# Patient Record
Sex: MALE | Race: WHITE | Employment: OTHER | URBAN - METROPOLITAN AREA
[De-identification: names, ages, dates, MRNs, and addresses within clinical notes are randomized per-mention and may not be internally consistent; named-entity substitution may affect disease eponyms.]

---

## 2018-07-09 ENCOUNTER — HOSPITAL ENCOUNTER (OUTPATIENT)
Age: 78
Discharge: HOME | End: 2018-07-11
Payer: MEDICARE

## 2018-07-09 PROCEDURE — 88305 TISSUE EXAM BY PATHOLOGIST: CPT

## 2018-09-28 ENCOUNTER — HOSPITAL ENCOUNTER (OUTPATIENT)
Age: 78
Discharge: HOME | End: 2018-09-30
Payer: MEDICARE

## 2018-09-28 PROCEDURE — 87077 CULTURE AEROBIC IDENTIFY: CPT

## 2018-09-28 PROCEDURE — 87088 URINE BACTERIA CULTURE: CPT

## 2018-09-28 PROCEDURE — 87186 SC STD MICRODIL/AGAR DIL: CPT

## 2018-09-30 LAB
ORGANISM: ABNORMAL
URINE CULTURE, ROUTINE: ABNORMAL
URINE CULTURE, ROUTINE: ABNORMAL

## 2018-10-08 ENCOUNTER — HOSPITAL ENCOUNTER (OUTPATIENT)
Age: 78
Discharge: HOME | End: 2018-10-10
Payer: MEDICARE

## 2018-10-08 PROCEDURE — 87088 URINE BACTERIA CULTURE: CPT

## 2018-10-08 PROCEDURE — 87077 CULTURE AEROBIC IDENTIFY: CPT

## 2018-10-08 PROCEDURE — 87186 SC STD MICRODIL/AGAR DIL: CPT

## 2018-10-10 LAB
ORGANISM: ABNORMAL
URINE CULTURE, ROUTINE: ABNORMAL
URINE CULTURE, ROUTINE: ABNORMAL

## 2018-10-11 VITALS
TEMPERATURE: 97.9 F | OXYGEN SATURATION: 99 % | WEIGHT: 145.5 LBS | HEIGHT: 71 IN | HEART RATE: 69 BPM | BODY MASS INDEX: 20.37 KG/M2 | RESPIRATION RATE: 18 BRPM | DIASTOLIC BLOOD PRESSURE: 78 MMHG | SYSTOLIC BLOOD PRESSURE: 145 MMHG

## 2018-10-12 ENCOUNTER — HOSPITAL ENCOUNTER (EMERGENCY)
Age: 78
Discharge: HOME | End: 2018-10-12
Payer: MEDICARE

## 2018-10-12 ENCOUNTER — PREP FOR PROCEDURE (OUTPATIENT)
Dept: UROLOGY | Age: 78
End: 2018-10-12

## 2018-10-12 ENCOUNTER — ANESTHESIA (OUTPATIENT)
Dept: OPERATING ROOM | Age: 78
End: 2018-10-12
Payer: MEDICARE

## 2018-10-12 ENCOUNTER — HOSPITAL ENCOUNTER (OUTPATIENT)
Age: 78
Setting detail: OUTPATIENT SURGERY
Discharge: HOME | End: 2018-10-12
Attending: UROLOGY | Admitting: UROLOGY
Payer: MEDICARE

## 2018-10-12 ENCOUNTER — ANESTHESIA EVENT (OUTPATIENT)
Dept: OPERATING ROOM | Age: 78
End: 2018-10-12
Payer: MEDICARE

## 2018-10-12 ENCOUNTER — APPOINTMENT (OUTPATIENT)
Dept: GENERAL RADIOLOGY | Age: 78
End: 2018-10-12
Attending: UROLOGY
Payer: MEDICARE

## 2018-10-12 VITALS
HEIGHT: 71 IN | RESPIRATION RATE: 18 BRPM | BODY MASS INDEX: 20.3 KG/M2 | DIASTOLIC BLOOD PRESSURE: 62 MMHG | TEMPERATURE: 97.9 F | WEIGHT: 145 LBS | HEART RATE: 52 BPM | OXYGEN SATURATION: 97 % | SYSTOLIC BLOOD PRESSURE: 108 MMHG

## 2018-10-12 VITALS — DIASTOLIC BLOOD PRESSURE: 63 MMHG | SYSTOLIC BLOOD PRESSURE: 111 MMHG | OXYGEN SATURATION: 100 %

## 2018-10-12 DIAGNOSIS — R33.9 RETENTION OF URINE: Primary | ICD-10-CM

## 2018-10-12 DIAGNOSIS — R33.9 URINARY RETENTION: Primary | ICD-10-CM

## 2018-10-12 PROCEDURE — 3700000001 HC ADD 15 MINUTES (ANESTHESIA): Performed by: UROLOGY

## 2018-10-12 PROCEDURE — 99283 EMERGENCY DEPT VISIT LOW MDM: CPT

## 2018-10-12 PROCEDURE — 6360000002 HC RX W HCPCS: Performed by: UROLOGY

## 2018-10-12 PROCEDURE — 7100000011 HC PHASE II RECOVERY - ADDTL 15 MIN: Performed by: UROLOGY

## 2018-10-12 PROCEDURE — 2580000003 HC RX 258

## 2018-10-12 PROCEDURE — 3600000013 HC SURGERY LEVEL 3 ADDTL 15MIN: Performed by: UROLOGY

## 2018-10-12 PROCEDURE — 2580000003 HC RX 258: Performed by: UROLOGY

## 2018-10-12 PROCEDURE — 87088 URINE BACTERIA CULTURE: CPT

## 2018-10-12 PROCEDURE — 3600000003 HC SURGERY LEVEL 3 BASE: Performed by: UROLOGY

## 2018-10-12 PROCEDURE — 74420 UROGRAPHY RTRGR +-KUB: CPT

## 2018-10-12 PROCEDURE — 3700000000 HC ANESTHESIA ATTENDED CARE: Performed by: UROLOGY

## 2018-10-12 PROCEDURE — C1758 CATHETER, URETERAL: HCPCS | Performed by: UROLOGY

## 2018-10-12 PROCEDURE — 6360000002 HC RX W HCPCS

## 2018-10-12 PROCEDURE — 7100000010 HC PHASE II RECOVERY - FIRST 15 MIN: Performed by: UROLOGY

## 2018-10-12 PROCEDURE — 2709999900 HC NON-CHARGEABLE SUPPLY: Performed by: UROLOGY

## 2018-10-12 RX ORDER — OXYCODONE HYDROCHLORIDE AND ACETAMINOPHEN 5; 325 MG/1; MG/1
1 TABLET ORAL EVERY 6 HOURS PRN
Status: DISCONTINUED | OUTPATIENT
Start: 2018-10-12 | End: 2018-10-12 | Stop reason: HOSPADM

## 2018-10-12 RX ORDER — FENTANYL CITRATE 50 UG/ML
INJECTION, SOLUTION INTRAMUSCULAR; INTRAVENOUS PRN
Status: DISCONTINUED | OUTPATIENT
Start: 2018-10-12 | End: 2018-10-12 | Stop reason: SDUPTHER

## 2018-10-12 RX ORDER — SODIUM CHLORIDE 9 MG/ML
INJECTION, SOLUTION INTRAVENOUS CONTINUOUS
Status: CANCELLED | OUTPATIENT
Start: 2018-10-12 | End: 2019-10-12

## 2018-10-12 RX ORDER — MORPHINE SULFATE 2 MG/ML
2 INJECTION, SOLUTION INTRAMUSCULAR; INTRAVENOUS EVERY 4 HOURS PRN
Status: DISCONTINUED | OUTPATIENT
Start: 2018-10-12 | End: 2018-10-12 | Stop reason: HOSPADM

## 2018-10-12 RX ORDER — SODIUM CHLORIDE 9 MG/ML
INJECTION, SOLUTION INTRAVENOUS CONTINUOUS PRN
Status: DISCONTINUED | OUTPATIENT
Start: 2018-10-12 | End: 2018-10-12 | Stop reason: SDUPTHER

## 2018-10-12 RX ORDER — SODIUM CHLORIDE 0.9 % (FLUSH) 0.9 %
10 SYRINGE (ML) INJECTION PRN
Status: CANCELLED | OUTPATIENT
Start: 2018-10-12 | End: 2019-10-12

## 2018-10-12 RX ORDER — SODIUM CHLORIDE 0.9 % (FLUSH) 0.9 %
10 SYRINGE (ML) INJECTION PRN
Status: DISCONTINUED | OUTPATIENT
Start: 2018-10-12 | End: 2018-10-12 | Stop reason: HOSPADM

## 2018-10-12 RX ORDER — SODIUM CHLORIDE 9 MG/ML
INJECTION, SOLUTION INTRAVENOUS CONTINUOUS
Status: DISCONTINUED | OUTPATIENT
Start: 2018-10-12 | End: 2018-10-12 | Stop reason: HOSPADM

## 2018-10-12 RX ORDER — CEPHALEXIN 500 MG/1
500 CAPSULE ORAL 3 TIMES DAILY
Qty: 21 CAPSULE | Refills: 0 | Status: SHIPPED | OUTPATIENT
Start: 2018-10-12 | End: 2018-10-19

## 2018-10-12 RX ORDER — SODIUM CHLORIDE 0.9 % (FLUSH) 0.9 %
10 SYRINGE (ML) INJECTION EVERY 12 HOURS SCHEDULED
Status: DISCONTINUED | OUTPATIENT
Start: 2018-10-12 | End: 2018-10-12 | Stop reason: HOSPADM

## 2018-10-12 RX ORDER — TRAMADOL HYDROCHLORIDE 50 MG/1
50 TABLET ORAL EVERY 6 HOURS PRN
Qty: 10 TABLET | Refills: 1 | Status: SHIPPED | OUTPATIENT
Start: 2018-10-12 | End: 2018-10-22

## 2018-10-12 RX ORDER — CIPROFLOXACIN 250 MG/1
250 TABLET, FILM COATED ORAL 2 TIMES DAILY
COMMUNITY
End: 2019-04-02 | Stop reason: ALTCHOICE

## 2018-10-12 RX ORDER — SODIUM CHLORIDE 0.9 % (FLUSH) 0.9 %
10 SYRINGE (ML) INJECTION EVERY 12 HOURS SCHEDULED
Status: CANCELLED | OUTPATIENT
Start: 2018-10-12 | End: 2019-10-12

## 2018-10-12 RX ORDER — ONDANSETRON 2 MG/ML
4 INJECTION INTRAMUSCULAR; INTRAVENOUS EVERY 6 HOURS PRN
Status: DISCONTINUED | OUTPATIENT
Start: 2018-10-12 | End: 2018-10-12 | Stop reason: HOSPADM

## 2018-10-12 RX ORDER — PROPOFOL 10 MG/ML
INJECTION, EMULSION INTRAVENOUS CONTINUOUS PRN
Status: DISCONTINUED | OUTPATIENT
Start: 2018-10-12 | End: 2018-10-12 | Stop reason: SDUPTHER

## 2018-10-12 RX ADMIN — Medication 2 G: at 12:56

## 2018-10-12 RX ADMIN — FENTANYL CITRATE 50 MCG: 50 INJECTION, SOLUTION INTRAMUSCULAR; INTRAVENOUS at 12:54

## 2018-10-12 RX ADMIN — SODIUM CHLORIDE: 9 INJECTION, SOLUTION INTRAVENOUS at 12:55

## 2018-10-12 RX ADMIN — SODIUM CHLORIDE: 9 INJECTION, SOLUTION INTRAVENOUS at 11:53

## 2018-10-12 RX ADMIN — PROPOFOL 80 MCG/KG/MIN: 10 INJECTION, EMULSION INTRAVENOUS at 12:56

## 2018-10-12 RX ADMIN — FENTANYL CITRATE 50 MCG: 50 INJECTION, SOLUTION INTRAMUSCULAR; INTRAVENOUS at 13:09

## 2018-10-12 ASSESSMENT — PULMONARY FUNCTION TESTS
PIF_VALUE: 1
PIF_VALUE: 0
PIF_VALUE: 0
PIF_VALUE: 1
PIF_VALUE: 0
PIF_VALUE: 1
PIF_VALUE: 1

## 2018-10-12 ASSESSMENT — PAIN SCALES - GENERAL
PAINLEVEL_OUTOF10: 0
PAINLEVEL_OUTOF10: 0

## 2018-10-12 ASSESSMENT — PAIN - FUNCTIONAL ASSESSMENT: PAIN_FUNCTIONAL_ASSESSMENT: 0-10

## 2018-10-12 NOTE — ED NOTES
Both I and ALISA Renteria attempted to put a Redd catheter in the pt and were unsuccessful     Jen Evangelista RN  10/12/18 [id number]

## 2018-10-12 NOTE — PROGRESS NOTES
Per CRNA's request, Dr. Ian Claudio (pt's PCP) updated through office on patient having ectopy in OR. Patient had a burst of PAT & some PVC's. None of which were sustained or required treatment. Patient & his wife updated also.

## 2018-10-14 LAB — URINE CULTURE, ROUTINE: NORMAL

## 2018-11-13 ENCOUNTER — HOSPITAL ENCOUNTER (OUTPATIENT)
Age: 78
Discharge: HOME | End: 2018-11-15
Payer: MEDICARE

## 2018-11-13 PROCEDURE — 87088 URINE BACTERIA CULTURE: CPT

## 2018-11-15 LAB — URINE CULTURE, ROUTINE: NORMAL

## 2018-12-05 ENCOUNTER — HOSPITAL ENCOUNTER (INPATIENT)
Age: 78
LOS: 2 days | Discharge: HOME | DRG: 310 | End: 2018-12-07
Attending: INTERNAL MEDICINE | Admitting: INTERNAL MEDICINE
Payer: MEDICARE

## 2018-12-05 PROBLEM — I47.29 NONSUSTAINED VENTRICULAR TACHYCARDIA (HCC): Status: ACTIVE | Noted: 2018-12-05

## 2018-12-05 PROBLEM — I47.20 VENTRICULAR TACHYCARDIA (PAROXYSMAL) (HCC): Status: ACTIVE | Noted: 2018-12-05

## 2018-12-05 PROBLEM — I34.0 MITRAL REGURGITATION: Status: ACTIVE | Noted: 2018-12-05

## 2018-12-05 PROBLEM — I47.29 VENTRICULAR TACHYCARDIA (PAROXYSMAL) (HCC): Status: ACTIVE | Noted: 2018-12-05

## 2018-12-05 PROBLEM — R55 NEAR SYNCOPE: Status: ACTIVE | Noted: 2018-12-05

## 2018-12-05 PROBLEM — R33.9 URINARY RETENTION: Status: RESOLVED | Noted: 2018-10-12 | Resolved: 2018-12-05

## 2018-12-05 PROCEDURE — 93005 ELECTROCARDIOGRAM TRACING: CPT

## 2018-12-05 PROCEDURE — 6370000000 HC RX 637 (ALT 250 FOR IP): Performed by: INTERNAL MEDICINE

## 2018-12-05 PROCEDURE — 2140000000 HC CCU INTERMEDIATE R&B

## 2018-12-05 PROCEDURE — 2580000003 HC RX 258: Performed by: INTERNAL MEDICINE

## 2018-12-05 RX ORDER — LISINOPRIL 5 MG/1
5 TABLET ORAL NIGHTLY
Status: DISCONTINUED | OUTPATIENT
Start: 2018-12-05 | End: 2018-12-07

## 2018-12-05 RX ORDER — CIPROFLOXACIN 250 MG/1
250 TABLET, FILM COATED ORAL 2 TIMES DAILY
Status: DISCONTINUED | OUTPATIENT
Start: 2018-12-05 | End: 2018-12-07 | Stop reason: HOSPADM

## 2018-12-05 RX ORDER — METOPROLOL SUCCINATE 25 MG/1
25 TABLET, EXTENDED RELEASE ORAL DAILY
Status: DISCONTINUED | OUTPATIENT
Start: 2018-12-05 | End: 2018-12-07 | Stop reason: HOSPADM

## 2018-12-05 RX ORDER — ONDANSETRON 2 MG/ML
4 INJECTION INTRAMUSCULAR; INTRAVENOUS EVERY 6 HOURS PRN
Status: DISCONTINUED | OUTPATIENT
Start: 2018-12-05 | End: 2018-12-07 | Stop reason: HOSPADM

## 2018-12-05 RX ORDER — SODIUM CHLORIDE 0.9 % (FLUSH) 0.9 %
10 SYRINGE (ML) INJECTION EVERY 12 HOURS SCHEDULED
Status: DISCONTINUED | OUTPATIENT
Start: 2018-12-05 | End: 2018-12-07 | Stop reason: HOSPADM

## 2018-12-05 RX ORDER — SODIUM CHLORIDE 0.9 % (FLUSH) 0.9 %
10 SYRINGE (ML) INJECTION PRN
Status: DISCONTINUED | OUTPATIENT
Start: 2018-12-05 | End: 2018-12-07 | Stop reason: HOSPADM

## 2018-12-05 RX ADMIN — METOPROLOL SUCCINATE 25 MG: 25 TABLET, FILM COATED, EXTENDED RELEASE ORAL at 19:44

## 2018-12-05 RX ADMIN — Medication 10 ML: at 21:53

## 2018-12-05 RX ADMIN — CIPROFLOXACIN HYDROCHLORIDE 250 MG: 250 TABLET, FILM COATED ORAL at 21:52

## 2018-12-05 RX ADMIN — LISINOPRIL 5 MG: 5 TABLET ORAL at 21:52

## 2018-12-05 ASSESSMENT — PAIN SCALES - GENERAL
PAINLEVEL_OUTOF10: 0
PAINLEVEL_OUTOF10: 0

## 2018-12-06 LAB
ALBUMIN SERPL-MCNC: 3.9 G/DL (ref 3.5–5.2)
ALP BLD-CCNC: 35 U/L (ref 40–129)
ALT SERPL-CCNC: 19 U/L (ref 0–40)
ANION GAP SERPL CALCULATED.3IONS-SCNC: 14 MMOL/L (ref 7–16)
AST SERPL-CCNC: 21 U/L (ref 0–39)
BILIRUB SERPL-MCNC: 0.7 MG/DL (ref 0–1.2)
BUN BLDV-MCNC: 23 MG/DL (ref 8–23)
CALCIUM SERPL-MCNC: 8.9 MG/DL (ref 8.6–10.2)
CHLORIDE BLD-SCNC: 105 MMOL/L (ref 98–107)
CO2: 21 MMOL/L (ref 22–29)
CREAT SERPL-MCNC: 1.1 MG/DL (ref 0.7–1.2)
GFR AFRICAN AMERICAN: >60
GFR NON-AFRICAN AMERICAN: >60 ML/MIN/1.73
GLUCOSE BLD-MCNC: 80 MG/DL (ref 74–99)
HCT VFR BLD CALC: 36.3 % (ref 37–54)
HEMOGLOBIN: 11.6 G/DL (ref 12.5–16.5)
LEFT VENTRICULAR EJECTION FRACTION HIGH VALUE: 50 %
LEFT VENTRICULAR EJECTION FRACTION MODE: NORMAL
LV EF: 45 %
LV EF: 48 %
LVEF MODALITY: NORMAL
MAGNESIUM: 2.2 MG/DL (ref 1.6–2.6)
MCH RBC QN AUTO: 29.9 PG (ref 26–35)
MCHC RBC AUTO-ENTMCNC: 32 % (ref 32–34.5)
MCV RBC AUTO: 93.6 FL (ref 80–99.9)
PDW BLD-RTO: 14.6 FL (ref 11.5–15)
PLATELET # BLD: 201 E9/L (ref 130–450)
PMV BLD AUTO: 11.1 FL (ref 7–12)
POTASSIUM SERPL-SCNC: 4 MMOL/L (ref 3.5–5)
PRO-BNP: 496 PG/ML (ref 0–450)
RBC # BLD: 3.88 E12/L (ref 3.8–5.8)
SODIUM BLD-SCNC: 140 MMOL/L (ref 132–146)
TOTAL PROTEIN: 6.1 G/DL (ref 6.4–8.3)
TSH SERPL DL<=0.05 MIU/L-ACNC: 1.94 UIU/ML (ref 0.27–4.2)
WBC # BLD: 5.9 E9/L (ref 4.5–11.5)

## 2018-12-06 PROCEDURE — 6370000000 HC RX 637 (ALT 250 FOR IP): Performed by: INTERNAL MEDICINE

## 2018-12-06 PROCEDURE — 83880 ASSAY OF NATRIURETIC PEPTIDE: CPT

## 2018-12-06 PROCEDURE — 80053 COMPREHEN METABOLIC PANEL: CPT

## 2018-12-06 PROCEDURE — 85027 COMPLETE CBC AUTOMATED: CPT

## 2018-12-06 PROCEDURE — 84443 ASSAY THYROID STIM HORMONE: CPT

## 2018-12-06 PROCEDURE — 36415 COLL VENOUS BLD VENIPUNCTURE: CPT

## 2018-12-06 PROCEDURE — 2580000003 HC RX 258: Performed by: INTERNAL MEDICINE

## 2018-12-06 PROCEDURE — 93306 TTE W/DOPPLER COMPLETE: CPT

## 2018-12-06 PROCEDURE — 83735 ASSAY OF MAGNESIUM: CPT

## 2018-12-06 PROCEDURE — 2140000000 HC CCU INTERMEDIATE R&B

## 2018-12-06 RX ADMIN — CIPROFLOXACIN HYDROCHLORIDE 250 MG: 250 TABLET, FILM COATED ORAL at 20:31

## 2018-12-06 RX ADMIN — METOPROLOL SUCCINATE 25 MG: 25 TABLET, FILM COATED, EXTENDED RELEASE ORAL at 08:18

## 2018-12-06 RX ADMIN — LISINOPRIL 5 MG: 5 TABLET ORAL at 20:35

## 2018-12-06 RX ADMIN — Medication 10 ML: at 08:19

## 2018-12-06 RX ADMIN — CIPROFLOXACIN HYDROCHLORIDE 250 MG: 250 TABLET, FILM COATED ORAL at 08:19

## 2018-12-06 RX ADMIN — Medication 10 ML: at 20:36

## 2018-12-06 ASSESSMENT — PAIN SCALES - GENERAL
PAINLEVEL_OUTOF10: 0

## 2018-12-06 NOTE — H&P
EXTREMITIES:  Otherwise unremarkable. RESPIRATORY SYSTEM:  Exam reveals a normal chest on inspection. Breath  sounds are equal  No rales or rhonchi. CARDIOVASCULAR SYSTEM:  Examination reveals a laterally displaced PMI  with a normal S1, S2 in the left sternal border and the apex. Grade 3/6  pansystolic murmur at the apex and the anterior axillary line was noted. ABDOMEN:  Soft. His EKG showed sinus rhythm with left bundle-branch block and frequent  PVCs. The Holter monitor performed by his family physician in October was  reviewed. There appeared to be frequent PVCs; however, one episode of  rapid nonsustained VT was also noted in addition to multiple episodes of  nonsustained atrial tachycardia. No other recent cardiac workup is available for review. ASSESSMENT:  A 70year-old patient with:  1. Known history of mitral valve disease with mitral valve repair  surgery more than 20 years ago. 2.  Recent episode of near syncope as well as one episode of documented  nonsustained VT on Holter monitoring a month ago. 3.  Frequent ventricular ectopy noted during his physical examinations  in the recent past.  4.  Left bundle-branch block on EKG, chronicity unclear. 5.  Progressive decline in exercise tolerance as per the patient's wife  who was in the office with him today. 6.  Physical exam suggests the presence of recurrent mitral  regurgitation. RECOMMENDATIONS:  In view of all of the above, I would suggest urgent  cardiac workup including echocardiography, lab work, and further  interventions as might be needed based on results of echocardiography. I suggested hospitalization and medical therapy to be started with beta  blockade as well as ACE inhibitor based on the results of his  echocardiography and the possible recurrence of mitral regurgitation. In addition, the episode of nonsustained VT and his episode of near  syncope are also of concern.   All of this was discussed with the  patient. He is agreeable to being hospitalized and therefore will be  admitted directly by me and started on medical therapy as well as  echocardiogram to be obtained as soon as possible. Further recommendations to follow.         Lina Evans MD    D: 12/05/2018 19:48:52       T: 12/05/2018 23:35:25     [email or web address]  Job#: [id number]     Doc#: [id number]    CC:  Renata Quintanilla MD

## 2018-12-07 VITALS
TEMPERATURE: 98 F | RESPIRATION RATE: 18 BRPM | BODY MASS INDEX: 20.16 KG/M2 | WEIGHT: 140.8 LBS | OXYGEN SATURATION: 99 % | HEART RATE: 51 BPM | SYSTOLIC BLOOD PRESSURE: 119 MMHG | DIASTOLIC BLOOD PRESSURE: 68 MMHG | HEIGHT: 70 IN

## 2018-12-07 LAB
ANION GAP SERPL CALCULATED.3IONS-SCNC: 12 MMOL/L (ref 7–16)
BUN BLDV-MCNC: 25 MG/DL (ref 8–23)
CALCIUM SERPL-MCNC: 9.1 MG/DL (ref 8.6–10.2)
CHLORIDE BLD-SCNC: 104 MMOL/L (ref 98–107)
CO2: 23 MMOL/L (ref 22–29)
CREAT SERPL-MCNC: 1.2 MG/DL (ref 0.7–1.2)
GFR AFRICAN AMERICAN: >60
GFR NON-AFRICAN AMERICAN: 59 ML/MIN/1.73
GLUCOSE BLD-MCNC: 91 MG/DL (ref 74–99)
MAGNESIUM: 2.2 MG/DL (ref 1.6–2.6)
POTASSIUM SERPL-SCNC: 4.3 MMOL/L (ref 3.5–5)
SODIUM BLD-SCNC: 139 MMOL/L (ref 132–146)

## 2018-12-07 PROCEDURE — 2580000003 HC RX 258: Performed by: INTERNAL MEDICINE

## 2018-12-07 PROCEDURE — 6370000000 HC RX 637 (ALT 250 FOR IP): Performed by: INTERNAL MEDICINE

## 2018-12-07 PROCEDURE — 80048 BASIC METABOLIC PNL TOTAL CA: CPT

## 2018-12-07 PROCEDURE — 36415 COLL VENOUS BLD VENIPUNCTURE: CPT

## 2018-12-07 PROCEDURE — 83735 ASSAY OF MAGNESIUM: CPT

## 2018-12-07 RX ORDER — METOPROLOL SUCCINATE 25 MG/1
25 TABLET, EXTENDED RELEASE ORAL DAILY
Qty: 30 TABLET | Refills: 3 | Status: ON HOLD | OUTPATIENT
Start: 2018-12-08 | End: 2019-04-03 | Stop reason: HOSPADM

## 2018-12-07 RX ORDER — CIPROFLOXACIN 250 MG/1
250 TABLET, FILM COATED ORAL 2 TIMES DAILY
Qty: 14 TABLET | Refills: 0 | Status: SHIPPED | OUTPATIENT
Start: 2018-12-07 | End: 2018-12-14

## 2018-12-07 RX ADMIN — CIPROFLOXACIN HYDROCHLORIDE 250 MG: 250 TABLET, FILM COATED ORAL at 09:21

## 2018-12-07 RX ADMIN — Medication 10 ML: at 09:22

## 2018-12-07 RX ADMIN — METOPROLOL SUCCINATE 25 MG: 25 TABLET, FILM COATED, EXTENDED RELEASE ORAL at 09:21

## 2018-12-07 ASSESSMENT — PAIN SCALES - GENERAL
PAINLEVEL_OUTOF10: 0
PAINLEVEL_OUTOF10: 0

## 2018-12-07 NOTE — PROGRESS NOTES
Dr. Paul Cunningham aware of patient's HR sustaining in 45s at times    Elena Art, RN
11.6*   HCT  36.3*   PLT  201     BMP: Recent Labs      12/06/18   0516  12/07/18   0457   NA  140  139   K  4.0  4.3   CO2  21*  23   BUN  23  25*   CREATININE  1.1  1.2   LABGLOM  >60  59   GLUCOSE  80  91     BNP: No results for input(s): BNP in the last 72 hours. PT/INR: No results for input(s): PROTIME, INR in the last 72 hours. APTT:No results for input(s): APTT in the last 72 hours. CARDIAC ENZYMES:No results for input(s): CKTOTAL, CKMB, CKMBINDEX, TROPONINI in the last 72 hours. FASTING LIPID PANEL:No results found for: HDL, LDLDIRECT, LDLCALC, TRIG  LIVER PROFILE:  Recent Labs      12/06/18   0516   AST  21   ALT  19   LABALBU  3.9       ASSESSMENT:    Patient Active Problem List   Diagnosis    Nonsustained ventricular tachycardia (HCC)    Near syncope    Mitral regurgitation--s/p MV repair    Ventricular tachycardia (paroxysmal) (HCC)   Mild hypotension related to new medications--asymptomatic  I did speak to patient's primary physician Dr. Rafael Mayer. He's EKG in the past has been normal. Left bundle branch block was noted only this year for the 1st time  Last echocardiogram was in 2009 and this revealed LVEF of 40% with no mitral regurgitation    PLAN:    Discontinue lisinopril in view of hypotension this am  Discharge home on Metoprolol  Follow-up in the office in 6-8 weeks  PMD will arrange for general cardiology follow-up in the future        Please see orders. Discussed with patient and nursing.     Rosita Figueroa MD,FACC

## 2019-01-08 NOTE — DISCHARGE SUMMARY
[address]                  [address] [facility],  [facility]                               DISCHARGE SUMMARY    PATIENT NAME: Jerod Franks                     :        1940  MED REC NO:   [id number]                            ROOM:       8526  ACCOUNT NO:   [de-identified]                           ADMIT DATE: 2018  PROVIDER:     Jax Redmond MD                  DISCHARGE DATE:  2018      HOSPITAL COURSE:  The patient is a 65-year-old patient with a known  history of mitral regurgitation for which he has undergone mitral valve  repair surgery. He was seen in my office for symptoms of increasing  fatigue as well as shortness of breath with physical activity. Monitoring has shown episodes of frequent PVCs as well as one episode of  nonsustained ventricular tachycardia. After being evaluated in the  office and his history of nonsustained VT and left bundle-branch block  on EKG as well as mitral valve disease, the patient was hospitalized  urgently especially since the patient also complained of symptoms  suggestive of severe near syncope. After hospitalization, the patient was placed on lisinopril and  metoprolol. Echocardiogram obtained during this hospitalization  revealed LVEF 45% to 50%. While being monitored for the length of 48  hours, the patient did not have any ventricular arrhythmias, on medical  therapy as mentioned above. He was, therefore, discharged home on   on the above-mentioned medical therapy although lisinopril had to be  discontinued because of persistent hypotension in the hospital.    His old records are reviewed. His left bundle branch block is  approximately a year old. Last echocardiogram in  had revealed LVEF  of 40% with no evidence of significant mitral regurgitation. PLAN AND RECOMMENDATION:  The patient will be discharged home on  metoprolol to be followed up as outpatient.   Consideration

## 2019-02-05 LAB
EKG ATRIAL RATE: 60 BPM
EKG P AXIS: 39 DEGREES
EKG P-R INTERVAL: 130 MS
EKG Q-T INTERVAL: 484 MS
EKG QRS DURATION: 142 MS
EKG QTC CALCULATION (BAZETT): 484 MS
EKG R AXIS: -4 DEGREES
EKG T AXIS: 174 DEGREES
EKG VENTRICULAR RATE: 60 BPM

## 2019-04-02 ENCOUNTER — HOSPITAL ENCOUNTER (OUTPATIENT)
Dept: GENERAL RADIOLOGY | Age: 79
Discharge: HOME | End: 2019-04-04
Attending: INTERNAL MEDICINE
Payer: MEDICARE

## 2019-04-02 ENCOUNTER — ANESTHESIA EVENT (OUTPATIENT)
Dept: CARDIAC CATH/INVASIVE PROCEDURES | Age: 79
End: 2019-04-02

## 2019-04-02 ENCOUNTER — HOSPITAL ENCOUNTER (OUTPATIENT)
Dept: CARDIAC CATH/INVASIVE PROCEDURES | Age: 79
Setting detail: OBSERVATION
Discharge: HOME | End: 2019-04-03
Attending: INTERNAL MEDICINE | Admitting: INTERNAL MEDICINE
Payer: MEDICARE

## 2019-04-02 ENCOUNTER — ANESTHESIA (OUTPATIENT)
Dept: CARDIAC CATH/INVASIVE PROCEDURES | Age: 79
End: 2019-04-02

## 2019-04-02 VITALS
DIASTOLIC BLOOD PRESSURE: 97 MMHG | OXYGEN SATURATION: 95 % | RESPIRATION RATE: 11 BRPM | SYSTOLIC BLOOD PRESSURE: 154 MMHG

## 2019-04-02 DIAGNOSIS — Z95.810 S/P ICD (INTERNAL CARDIAC DEFIBRILLATOR) PROCEDURE: ICD-10-CM

## 2019-04-02 DIAGNOSIS — Z95.810 HISTORY OF IMPLANTABLE CARDIOVERTER-DEFIBRILLATOR (ICD) PLACEMENT: ICD-10-CM

## 2019-04-02 LAB
ANION GAP SERPL CALCULATED.3IONS-SCNC: 11 MMOL/L (ref 7–16)
BUN BLDV-MCNC: 21 MG/DL (ref 8–23)
CALCIUM SERPL-MCNC: 8.9 MG/DL (ref 8.6–10.2)
CHLORIDE BLD-SCNC: 104 MMOL/L (ref 98–107)
CO2: 28 MMOL/L (ref 22–29)
CREAT SERPL-MCNC: 1 MG/DL (ref 0.7–1.2)
GFR AFRICAN AMERICAN: >60
GFR NON-AFRICAN AMERICAN: >60 ML/MIN/1.73
GLUCOSE BLD-MCNC: 90 MG/DL (ref 74–99)
HCT VFR BLD CALC: 38.9 % (ref 37–54)
HEMOGLOBIN: 12.6 G/DL (ref 12.5–16.5)
MCH RBC QN AUTO: 30.4 PG (ref 26–35)
MCHC RBC AUTO-ENTMCNC: 32.4 % (ref 32–34.5)
MCV RBC AUTO: 93.7 FL (ref 80–99.9)
PDW BLD-RTO: 14 FL (ref 11.5–15)
PLATELET # BLD: 173 E9/L (ref 130–450)
PMV BLD AUTO: 10.8 FL (ref 7–12)
POTASSIUM SERPL-SCNC: 4.4 MMOL/L (ref 3.5–5)
RBC # BLD: 4.15 E12/L (ref 3.8–5.8)
SODIUM BLD-SCNC: 143 MMOL/L (ref 132–146)
WBC # BLD: 4.7 E9/L (ref 4.5–11.5)

## 2019-04-02 PROCEDURE — 6370000000 HC RX 637 (ALT 250 FOR IP): Performed by: INTERNAL MEDICINE

## 2019-04-02 PROCEDURE — G0378 HOSPITAL OBSERVATION PER HR: HCPCS

## 2019-04-02 PROCEDURE — 6360000002 HC RX W HCPCS: Performed by: NURSE ANESTHETIST, CERTIFIED REGISTERED

## 2019-04-02 PROCEDURE — 2500000003 HC RX 250 WO HCPCS

## 2019-04-02 PROCEDURE — 3700000001 HC ADD 15 MINUTES (ANESTHESIA)

## 2019-04-02 PROCEDURE — C1730 CATH, EP, 19 OR FEW ELECT: HCPCS

## 2019-04-02 PROCEDURE — 2580000003 HC RX 258: Performed by: NURSE ANESTHETIST, CERTIFIED REGISTERED

## 2019-04-02 PROCEDURE — 85027 COMPLETE CBC AUTOMATED: CPT

## 2019-04-02 PROCEDURE — C1781 MESH (IMPLANTABLE): HCPCS

## 2019-04-02 PROCEDURE — 36415 COLL VENOUS BLD VENIPUNCTURE: CPT

## 2019-04-02 PROCEDURE — C1721 AICD, DUAL CHAMBER: HCPCS

## 2019-04-02 PROCEDURE — 3700000000 HC ANESTHESIA ATTENDED CARE

## 2019-04-02 PROCEDURE — 71045 X-RAY EXAM CHEST 1 VIEW: CPT

## 2019-04-02 PROCEDURE — C1732 CATH, EP, DIAG/ABL, 3D/VECT: HCPCS

## 2019-04-02 PROCEDURE — 80048 BASIC METABOLIC PNL TOTAL CA: CPT

## 2019-04-02 PROCEDURE — C1894 INTRO/SHEATH, NON-LASER: HCPCS

## 2019-04-02 PROCEDURE — 2500000003 HC RX 250 WO HCPCS: Performed by: NURSE ANESTHETIST, CERTIFIED REGISTERED

## 2019-04-02 PROCEDURE — 33249 INSJ/RPLCMT DEFIB W/LEAD(S): CPT | Performed by: INTERNAL MEDICINE

## 2019-04-02 PROCEDURE — C1777 LEAD, AICD, ENDO SINGLE COIL: HCPCS

## 2019-04-02 PROCEDURE — 93620 COMP EP EVL R AT VEN PAC&REC: CPT | Performed by: INTERNAL MEDICINE

## 2019-04-02 PROCEDURE — 2580000003 HC RX 258: Performed by: INTERNAL MEDICINE

## 2019-04-02 PROCEDURE — G0379 DIRECT REFER HOSPITAL OBSERV: HCPCS

## 2019-04-02 PROCEDURE — 6360000002 HC RX W HCPCS

## 2019-04-02 PROCEDURE — 2709999900 HC NON-CHARGEABLE SUPPLY

## 2019-04-02 PROCEDURE — C1898 LEAD, PMKR, OTHER THAN TRANS: HCPCS

## 2019-04-02 RX ORDER — SODIUM CHLORIDE 0.9 % (FLUSH) 0.9 %
10 SYRINGE (ML) INJECTION PRN
Status: DISCONTINUED | OUTPATIENT
Start: 2019-04-02 | End: 2019-04-03 | Stop reason: HOSPADM

## 2019-04-02 RX ORDER — SODIUM CHLORIDE 9 MG/ML
INJECTION, SOLUTION INTRAVENOUS ONCE
Status: COMPLETED | OUTPATIENT
Start: 2019-04-02 | End: 2019-04-02

## 2019-04-02 RX ORDER — SODIUM CHLORIDE 0.9 % (FLUSH) 0.9 %
10 SYRINGE (ML) INJECTION EVERY 12 HOURS SCHEDULED
Status: DISCONTINUED | OUTPATIENT
Start: 2019-04-02 | End: 2019-04-03 | Stop reason: HOSPADM

## 2019-04-02 RX ORDER — LISINOPRIL 5 MG/1
5 TABLET ORAL NIGHTLY
Status: DISCONTINUED | OUTPATIENT
Start: 2019-04-02 | End: 2019-04-03 | Stop reason: HOSPADM

## 2019-04-02 RX ORDER — SODIUM CHLORIDE 9 MG/ML
INJECTION, SOLUTION INTRAVENOUS CONTINUOUS PRN
Status: DISCONTINUED | OUTPATIENT
Start: 2019-04-02 | End: 2019-04-02 | Stop reason: SDUPTHER

## 2019-04-02 RX ORDER — ACETAMINOPHEN 325 MG/1
650 TABLET ORAL EVERY 4 HOURS PRN
Status: DISCONTINUED | OUTPATIENT
Start: 2019-04-02 | End: 2019-04-03 | Stop reason: HOSPADM

## 2019-04-02 RX ORDER — CEFAZOLIN SODIUM 1 G/50ML
1 SOLUTION INTRAVENOUS EVERY 8 HOURS
Status: DISCONTINUED | OUTPATIENT
Start: 2019-04-02 | End: 2019-04-03 | Stop reason: HOSPADM

## 2019-04-02 RX ORDER — DIPHENHYDRAMINE HYDROCHLORIDE 50 MG/ML
INJECTION INTRAMUSCULAR; INTRAVENOUS PRN
Status: DISCONTINUED | OUTPATIENT
Start: 2019-04-02 | End: 2019-04-02 | Stop reason: SDUPTHER

## 2019-04-02 RX ORDER — VANCOMYCIN HYDROCHLORIDE 1 G/20ML
INJECTION, POWDER, LYOPHILIZED, FOR SOLUTION INTRAVENOUS PRN
Status: DISCONTINUED | OUTPATIENT
Start: 2019-04-02 | End: 2019-04-02 | Stop reason: SDUPTHER

## 2019-04-02 RX ORDER — PROPOFOL 10 MG/ML
INJECTION, EMULSION INTRAVENOUS PRN
Status: DISCONTINUED | OUTPATIENT
Start: 2019-04-02 | End: 2019-04-02 | Stop reason: SDUPTHER

## 2019-04-02 RX ORDER — METOPROLOL SUCCINATE 50 MG/1
50 TABLET, EXTENDED RELEASE ORAL DAILY
Status: DISCONTINUED | OUTPATIENT
Start: 2019-04-03 | End: 2019-04-03 | Stop reason: HOSPADM

## 2019-04-02 RX ORDER — MIDAZOLAM HYDROCHLORIDE 1 MG/ML
INJECTION INTRAMUSCULAR; INTRAVENOUS PRN
Status: DISCONTINUED | OUTPATIENT
Start: 2019-04-02 | End: 2019-04-02 | Stop reason: SDUPTHER

## 2019-04-02 RX ORDER — CEFAZOLIN SODIUM 1 G/3ML
INJECTION, POWDER, FOR SOLUTION INTRAMUSCULAR; INTRAVENOUS PRN
Status: DISCONTINUED | OUTPATIENT
Start: 2019-04-02 | End: 2019-04-02 | Stop reason: SDUPTHER

## 2019-04-02 RX ADMIN — Medication 10 ML: at 21:04

## 2019-04-02 RX ADMIN — SODIUM CHLORIDE: 9 INJECTION, SOLUTION INTRAVENOUS at 14:42

## 2019-04-02 RX ADMIN — PROPOFOL 300 MG: 10 INJECTION, EMULSION INTRAVENOUS at 14:45

## 2019-04-02 RX ADMIN — DIPHENHYDRAMINE HYDROCHLORIDE 50 MG: 50 INJECTION, SOLUTION INTRAMUSCULAR; INTRAVENOUS at 16:12

## 2019-04-02 RX ADMIN — LISINOPRIL 5 MG: 5 TABLET ORAL at 21:03

## 2019-04-02 RX ADMIN — MIDAZOLAM HYDROCHLORIDE 2 MG: 1 INJECTION, SOLUTION INTRAMUSCULAR; INTRAVENOUS at 16:04

## 2019-04-02 RX ADMIN — FAMOTIDINE 20 MG: 10 INJECTION INTRAVENOUS at 16:17

## 2019-04-02 RX ADMIN — MIDAZOLAM HYDROCHLORIDE 2 MG: 1 INJECTION, SOLUTION INTRAMUSCULAR; INTRAVENOUS at 14:45

## 2019-04-02 RX ADMIN — CEFAZOLIN 2000 MG: 1 INJECTION, POWDER, FOR SOLUTION INTRAMUSCULAR; INTRAVENOUS; PARENTERAL at 16:00

## 2019-04-02 RX ADMIN — SODIUM CHLORIDE: 9 INJECTION, SOLUTION INTRAVENOUS at 12:19

## 2019-04-02 RX ADMIN — HYDROCORTISONE SODIUM SUCCINATE 100 MG: 100 INJECTION, POWDER, FOR SOLUTION INTRAMUSCULAR; INTRAVENOUS at 16:10

## 2019-04-02 RX ADMIN — VANCOMYCIN HYDROCHLORIDE 1000 MG: 1 INJECTION, POWDER, LYOPHILIZED, FOR SOLUTION INTRAVENOUS at 16:00

## 2019-04-02 RX ADMIN — SODIUM CHLORIDE: 9 INJECTION, SOLUTION INTRAVENOUS at 12:18

## 2019-04-02 ASSESSMENT — PAIN SCALES - GENERAL
PAINLEVEL_OUTOF10: 0

## 2019-04-02 NOTE — ANESTHESIA PRE PROCEDURE
Department of Anesthesiology  Preprocedure Note       Name:  Bryce Kennedy   Age:  70 y.o.  :  1940                                          MRN:  [id number]         Date:  2019      Surgeon: * No surgeons listed *    Procedure: Procedure(s):  EP study    Medications prior to admission:   Prior to Admission medications    Medication Sig Start Date End Date Taking? Authorizing Provider   metoprolol succinate (TOPROL XL) 25 MG extended release tablet Take 1 tablet by mouth daily 18  Yes John Rivera MD       Current medications:    Current Outpatient Medications   Medication Sig Dispense Refill    metoprolol succinate (TOPROL XL) 25 MG extended release tablet Take 1 tablet by mouth daily 30 tablet 3     No current facility-administered medications for this encounter. Allergies: Allergies   Allergen Reactions    Shrimp (Diagnostic)        Problem List:    Patient Active Problem List   Diagnosis Code    Nonsustained ventricular tachycardia (HCC) I47.2    Near syncope R55    Mitral regurgitation I34.0    Ventricular tachycardia (paroxysmal) (HCC) I47.2       Past Medical History:        Diagnosis Date    Arthritis        Past Surgical History:        Procedure Laterality Date    CARDIAC VALVE SURGERY          KS X-RAY RETROGRADE PYELOGRAM N/A 10/12/2018    CYSTOSCOPY, URETHRAL DILATATION, INSERTION OF SINGER performed by Gagandeep Eldridge MD at [facility]      2018       Social History:    Social History     Tobacco Use    Smoking status: Never Smoker    Smokeless tobacco: Never Used   Substance Use Topics    Alcohol use:  No                                Counseling given: Not Answered      Vital Signs (Current):   Vitals:    19 1153   BP: (!) 161/81   Pulse: 52   Resp: 18   Temp: 97.9 °F (36.6 °C)   Weight: 145 lb (65.8 kg)   Height: 5' 11\" (1.803 m)                                              BP Readings from Last 3 Encounters:   19 (!) 161/81   18 119/68   10/12/18 108/62       NPO Status:  > 8hrs                                                                               BMI:   Wt Readings from Last 3 Encounters:   04/02/19 145 lb (65.8 kg)   12/07/18 140 lb 12.8 oz (63.9 kg)   10/12/18 145 lb (65.8 kg)     Body mass index is 20.22 kg/m². CBC:   Lab Results   Component Value Date    WBC 4.7 04/02/2019    RBC 4.15 04/02/2019    HGB 12.6 04/02/2019    HCT 38.9 04/02/2019    MCV 93.7 04/02/2019    RDW 14.0 04/02/2019     04/02/2019       CMP:   Lab Results   Component Value Date     04/02/2019    K 4.4 04/02/2019     04/02/2019    CO2 28 04/02/2019    BUN 21 04/02/2019    CREATININE 1.0 04/02/2019    GFRAA >60 04/02/2019    LABGLOM >60 04/02/2019    GLUCOSE 90 04/02/2019    PROT 6.1 12/06/2018    CALCIUM 8.9 04/02/2019    BILITOT 0.7 12/06/2018    ALKPHOS 35 12/06/2018    AST 21 12/06/2018    ALT 19 12/06/2018       POC Tests: No results for input(s): POCGLU, POCNA, POCK, POCCL, POCBUN, POCHEMO, POCHCT in the last 72 hours.     Coags: No results found for: PROTIME, INR, APTT    HCG (If Applicable): No results found for: PREGTESTUR, PREGSERUM, HCG, HCGQUANT     ABGs: No results found for: PHART, PO2ART, CQW4HEP, ZKH6TXE, BEART, A9UIOESN     Type & Screen (If Applicable):  No results found for: LABABO, [address]    Anesthesia Evaluation  Patient summary reviewed no history of anesthetic complications:   Airway: Mallampati: II  TM distance: >3 FB   Neck ROM: full  Mouth opening: > = 3 FB Dental: normal exam         Pulmonary:Negative Pulmonary ROS breath sounds clear to auscultation                             Cardiovascular:    (+) valvular problems/murmurs (Hx of valve surgery (MVr)):, dysrhythmias: ventricular tachycardia,         Rhythm: regular  Rate: normal                 ROS comment: Left ventricular size is normal.   Mild left ventricular concentric hypertrophy noted.   Abnormal (paradoxical) motion consistent with left bundle branch

## 2019-04-03 ENCOUNTER — APPOINTMENT (OUTPATIENT)
Dept: GENERAL RADIOLOGY | Age: 79
End: 2019-04-03
Attending: INTERNAL MEDICINE
Payer: MEDICARE

## 2019-04-03 VITALS
WEIGHT: 145 LBS | HEART RATE: 72 BPM | TEMPERATURE: 97.7 F | BODY MASS INDEX: 20.3 KG/M2 | SYSTOLIC BLOOD PRESSURE: 120 MMHG | HEIGHT: 71 IN | RESPIRATION RATE: 16 BRPM | DIASTOLIC BLOOD PRESSURE: 65 MMHG | OXYGEN SATURATION: 97 %

## 2019-04-03 LAB
ANION GAP SERPL CALCULATED.3IONS-SCNC: 14 MMOL/L (ref 7–16)
BUN BLDV-MCNC: 19 MG/DL (ref 8–23)
CALCIUM SERPL-MCNC: 8.5 MG/DL (ref 8.6–10.2)
CHLORIDE BLD-SCNC: 106 MMOL/L (ref 98–107)
CO2: 21 MMOL/L (ref 22–29)
CREAT SERPL-MCNC: 1 MG/DL (ref 0.7–1.2)
GFR AFRICAN AMERICAN: >60
GFR NON-AFRICAN AMERICAN: >60 ML/MIN/1.73
GLUCOSE BLD-MCNC: 100 MG/DL (ref 74–99)
HCT VFR BLD CALC: 37.4 % (ref 37–54)
HEMOGLOBIN: 12.3 G/DL (ref 12.5–16.5)
MCH RBC QN AUTO: 30.7 PG (ref 26–35)
MCHC RBC AUTO-ENTMCNC: 32.9 % (ref 32–34.5)
MCV RBC AUTO: 93.3 FL (ref 80–99.9)
PDW BLD-RTO: 14 FL (ref 11.5–15)
PLATELET # BLD: 174 E9/L (ref 130–450)
PMV BLD AUTO: 11.1 FL (ref 7–12)
POTASSIUM SERPL-SCNC: 3.9 MMOL/L (ref 3.5–5)
RBC # BLD: 4.01 E12/L (ref 3.8–5.8)
SODIUM BLD-SCNC: 141 MMOL/L (ref 132–146)
WBC # BLD: 9.6 E9/L (ref 4.5–11.5)

## 2019-04-03 PROCEDURE — 6360000002 HC RX W HCPCS: Performed by: INTERNAL MEDICINE

## 2019-04-03 PROCEDURE — 80048 BASIC METABOLIC PNL TOTAL CA: CPT

## 2019-04-03 PROCEDURE — 85027 COMPLETE CBC AUTOMATED: CPT

## 2019-04-03 PROCEDURE — 2580000003 HC RX 258: Performed by: INTERNAL MEDICINE

## 2019-04-03 PROCEDURE — 6370000000 HC RX 637 (ALT 250 FOR IP): Performed by: INTERNAL MEDICINE

## 2019-04-03 PROCEDURE — 36415 COLL VENOUS BLD VENIPUNCTURE: CPT

## 2019-04-03 PROCEDURE — G0378 HOSPITAL OBSERVATION PER HR: HCPCS

## 2019-04-03 PROCEDURE — 71046 X-RAY EXAM CHEST 2 VIEWS: CPT

## 2019-04-03 RX ORDER — DOXYCYCLINE HYCLATE 100 MG
100 TABLET ORAL 2 TIMES DAILY
Qty: 14 TABLET | Refills: 0 | Status: SHIPPED | OUTPATIENT
Start: 2019-04-03 | End: 2019-04-10

## 2019-04-03 RX ORDER — METOPROLOL SUCCINATE 50 MG/1
50 TABLET, EXTENDED RELEASE ORAL DAILY
Qty: 30 TABLET | Refills: 3 | Status: SHIPPED | OUTPATIENT
Start: 2019-04-04

## 2019-04-03 RX ORDER — LISINOPRIL 5 MG/1
5 TABLET ORAL NIGHTLY
Qty: 30 TABLET | Refills: 3 | Status: SHIPPED | OUTPATIENT
Start: 2019-04-03 | End: 2019-04-17 | Stop reason: DRUGHIGH

## 2019-04-03 RX ADMIN — ACETAMINOPHEN 650 MG: 325 TABLET, FILM COATED ORAL at 11:50

## 2019-04-03 RX ADMIN — Medication 10 ML: at 09:43

## 2019-04-03 RX ADMIN — CEFAZOLIN SODIUM 1 G: 1 SOLUTION INTRAVENOUS at 09:43

## 2019-04-03 RX ADMIN — ACETAMINOPHEN 650 MG: 325 TABLET, FILM COATED ORAL at 04:39

## 2019-04-03 RX ADMIN — CEFAZOLIN SODIUM 1 G: 1 SOLUTION INTRAVENOUS at 01:02

## 2019-04-03 RX ADMIN — METOPROLOL SUCCINATE 50 MG: 50 TABLET, EXTENDED RELEASE ORAL at 09:43

## 2019-04-03 ASSESSMENT — PAIN DESCRIPTION - PAIN TYPE: TYPE: ACUTE PAIN

## 2019-04-03 ASSESSMENT — PAIN DESCRIPTION - PROGRESSION
CLINICAL_PROGRESSION: GRADUALLY WORSENING
CLINICAL_PROGRESSION: GRADUALLY IMPROVING

## 2019-04-03 ASSESSMENT — PAIN DESCRIPTION - ONSET
ONSET: GRADUAL
ONSET: GRADUAL

## 2019-04-03 ASSESSMENT — PAIN DESCRIPTION - LOCATION: LOCATION: SHOULDER

## 2019-04-03 ASSESSMENT — PAIN SCALES - GENERAL
PAINLEVEL_OUTOF10: 3
PAINLEVEL_OUTOF10: 0
PAINLEVEL_OUTOF10: 7
PAINLEVEL_OUTOF10: 0
PAINLEVEL_OUTOF10: 0

## 2019-04-03 ASSESSMENT — PAIN DESCRIPTION - DESCRIPTORS: DESCRIPTORS: ACHING;DISCOMFORT;SORE

## 2019-04-03 ASSESSMENT — PAIN DESCRIPTION - FREQUENCY: FREQUENCY: INTERMITTENT

## 2019-04-03 ASSESSMENT — PAIN - FUNCTIONAL ASSESSMENT
PAIN_FUNCTIONAL_ASSESSMENT: PREVENTS OR INTERFERES SOME ACTIVE ACTIVITIES AND ADLS
PAIN_FUNCTIONAL_ASSESSMENT: PREVENTS OR INTERFERES SOME ACTIVE ACTIVITIES AND ADLS

## 2019-04-03 ASSESSMENT — PAIN DESCRIPTION - ORIENTATION: ORIENTATION: LEFT

## 2019-04-03 NOTE — PROCEDURES
[address]                  [address] [facility],  [facility]                                 PROCEDURE NOTE    PATIENT NAME: Joaquina Jones                     :        1940  MED REC NO:   [id number]                            ROOM:       6411  ACCOUNT NO:   [de-identified]                           ADMIT DATE: 2019  PROVIDER:     Ricky Juarez MD    DATE OF PROCEDURE:  2019    NAME OF THE PROCEDURE:  Insertion of a dual chamber pacer ICD. PREPROCEDURE DIAGNOSES:  Nonsustained VT, nonischemic cardiomyopathy,  LVEF of 45%-50%, positive electrophysiology study for inducible  sustained polymorphic VT degenerating into ventricular fibrillation,  mitral valve disease with previous mitral valve repair surgery and near  syncope. POSTPROCEDURE DIAGNOSES:  Nonsustained VT, nonischemic cardiomyopathy,  LVEF of 45%-50%, positive electrophysiology study for inducible  sustained polymorphic VT degenerating into ventricular fibrillation,  mitral valve disease with previous mitral valve repair surgery and near  syncope    :  Ricky Juarez M.D. COMPLICATIONS:  None. INDICATIONS:  This 46-year-old patient was brought in today for  electrophysiology testing because of his history of near syncope and  nonsustained VT on Holter monitoring. Because of the fact the patient's  episode of nonsustained VT on Holter was polymorphic VT, the patient had  a Holter monitor repeated on beta blockade and frequent ventricular  couplets and triplets were noted and therefore I suggested EP-guided  management. The patient was agreeable. He was brought in for the same. DESCRIPTION OF PROCEDURE:  The patient was brought to the  electrophysiology area in the postabsorptive, nonsedated state.   After  the usual noninvasive blood pressure and heart rate monitoring were  instituted, the patient was sedated with IV medications by anesthesia  and _____ was placed to

## 2019-04-03 NOTE — PROCEDURES
[address]                  [address] [facility],  [facility]                                 PROCEDURE NOTE    PATIENT NAME: Pawan Schmidt                     :        1940  MED REC NO:   [id number]                            ROOM:       6411  ACCOUNT NO:   [de-identified]                           ADMIT DATE: 2019  PROVIDER:     Nan Torrez MD    DATE OF PROCEDURE:  2019    NAME OF THE PROCEDURE:  Electrophysiology study. PREOPERATIVE DIAGNOSES:  Nonsustained ventricular tachycardia; mitral  valve disease, status post mitral valve repair; near syncope. POSTOPERATIVE DIAGNOSES:  Nonsustained ventricular tachycardia; mitral  valve disease, status post mitral valve repair; near syncope. :  Nan Torrez M.D. COMPLICATIONS:  None. INDICATIONS:  This is a 82-year-old patient with a history of mitral  valve repair surgery and pulmonary hypertension on echocardiography. Although his LVEF was 45 to 50%, was brought into the lab today for  electrophysiology testing in view of episodes of near syncope and  nonsustained VT on Holter monitoring. He was placed on oral  beta-blocker therapy, but could not tolerate more than 25 mg of  metoprolol due to underlying sinus node dysfunction and sinus  bradycardia. The patient's Holter monitor was repeated following beta  blockade and nonsustained VT was still present and therefore,  electrophysiology-guided management was suggested to him. The procedure  was discussed with him at length. Risks and benefits were explained. He understood and wished to proceed. He was therefore brought in for  the same. PROCEDURE:  The patient was brought to the electrophysiology area in the  postabsorptive, nonsedated state. After the usual noninvasive blood  pressure and heart rate monitoring were instituted, the patient was  sedated using IV medications.   Using 1% lidocaine as local anesthesia,  three 6-Indonesian introducers were placed in the right femoral vein. These  were utilized to place quadripolar catheters into the high right atrium,  the His bundle region and the RV. Baseline interval measurements were  as follows:  Sinus cycle length was 104 msec, , ,   msec, AH was 101 and HV was 70 msec. Sinus node recovery time were normal.    On incremental atrial pacing, AV block occurred at 320 msec and there  was no VA conduction at baseline. Programmed stimulation from the high right atrium at a basic drive cycle  of 051 revealed AV verenice ERP of less than 240 msec, but an S2 at 320 AV  block occurred at 260 msec. Programmed stimulation was not performed from the right ventricular  apex. Using double and triple extra stimuli, drive cycles of 758 and  400 frequent polymorphic and monomorphic nonsustained VT was induced. The patient was now placed on Isuprel. A 2 mg/minute Isuprel infusion  was started. Programmed stimulation was repeated. With a drive cycle  of 203 now with three extra stimuli, which were not closely coupled,  frequent polymorphic VT degenerating into ventricular fibrillation was  induced, which was successfully defibrillated. The patient was awakened  and recovered. CONCLUSION:  1. Normal intracardiac intervals, except for mildly prolonged HV  interval of 70 msec. 2.  Left bundle-branch block on EKG at baseline. 3.  Easily inducible nonsustained polymorphic VT at baseline testing  with programmed stimulation. 4.  Polymorphic VT degenerating into ventricular fibrillation, induced  on small doses of IV Isuprel with programmed stimulation. RECOMMENDATION:  Proceed with dual chamber pacer ICD implantation. Despite the fact that we were not able to demonstrate sinus node  dysfunction during the EP study, the patient clearly has sinus node  dysfunction as noted by severe sinus bradycardia on a small dose of beta  blockade.   Despite the left bundle-branch block, I will not place an LV  lead since LVEF is close to 45 to 50% and the patient does not have any  symptoms of congestive heart failure nor hospitalizations for heart  failure.         Rolan Hayden MD    D: 04/02/2019 17:50:04       T: 04/03/2019 2:52:14     [email or web address]  Job#: [id number]     Doc#: [id number]    CC:  MD Duarte Bermudez Adjutant, DO

## 2019-04-03 NOTE — CARE COORDINATION
Care Coordination:  Visited patient to discuss transition of care upon discharge. Lives with wife at home. Completely independent. Does not anticipate any home going needs. No hx of carloz, hhc or dme. Anticipates dc home today. Uses hometown pharmacy in [place]. States he will need a script on discharge for Metaprolol when Dr Lowell Dangelo rounds. Wife will be available for transport upon discharge.     Abelardo Screws

## 2019-04-03 NOTE — PROGRESS NOTES
(ANCEF) IVPB  1 g Intravenous Q8H       IV Infusions (if any):      Diagnostics:    EKG: normal sinus rhythm, unchanged from previous tracings. ECHO: reviewed. Ejection fraction: 45%  Stress Test: not obtained. Cardiac Angiography: not obtained. Device Evaluation    Make/model  Medtronic Evera XT  Mode  MVP R 50-1 20  P waves   2.4    mV     Impedance  456       Ohms   Pacing threshold  0.75  V@  0.4   msec  R waves   16.6    MV     Impedance 418        Ohms   Pacing threshold 0.5   V@  0.4   msec   Pacing percentage  A     1.4%          V      LESS than 1%  High voltage impedance     54 ohms  Battery longevity  n/a  Arrhythmias  none      Evaluation and reprogramming included   Overall device function is normal  All  device programmable settings were evaluated per above, along with iterative adjustments (capture thresholds) to assess and select the most appropriate final programming for consistent delivery off the appropriate therapy and to verify function of the device. Labs:   CBC:   Recent Labs     04/02/19  1200 04/03/19  0539   WBC 4.7 9.6   HGB 12.6 12.3*   HCT 38.9 37.4    174     BMP:   Recent Labs     04/02/19  1200 04/03/19  0539    141   K 4.4 3.9   CO2 28 21*   BUN 21 19   CREATININE 1.0 1.0   LABGLOM >60 >60   GLUCOSE 90 100*     BNP: No results for input(s): BNP in the last 72 hours. PT/INR: No results for input(s): PROTIME, INR in the last 72 hours. APTT:No results for input(s): APTT in the last 72 hours. CARDIAC ENZYMES:No results for input(s): CKTOTAL, CKMB, CKMBINDEX, TROPONINI in the last 72 hours. FASTING LIPID PANEL:No results found for: HDL, LDLDIRECT, LDLCALC, TRIG  LIVER PROFILE:No results for input(s): AST, ALT, LABALBU in the last 72 hours.     ASSESSMENT:    Patient Active Problem List   Diagnosis    Nonsustained ventricular tachycardia (HCC)    Near syncope    Ventricular tachycardia (paroxysmal) (HCC)    S/P ICD (internal cardiac defibrillator) procedure--site healing well, excellent parameters    Mitral valve disease, status post mitral valve repair  Electrophysiology testing revealing the presence of inducible sustained  polymorphic VT, nonsustained monomorphic and polymorphic VT. Now post dual-chamber pacer ICD implantation    PLAN:    Discharge home  Discharge instructions given in detail  Medications instructions and med rec completed  Follow-up in the office in one week      Please see orders. Discussed with patient and nursing.     Yasmany Chase MD,FACC

## 2019-04-03 NOTE — PROGRESS NOTES
[facility]   Department of Pharmacy   Pharmacist Transition of Care Services         Patient Demographics  Name:  [address] [place] [address] Record Number:  [id number]  Gender:  male   Age:  70 y.o. YOB: 1940    Primary Care Physician: Molina Candelaria DO  Primary Care Physician phone number:  [phone]  Readmission Risk (% from EPIC Patient List): 9%     Patient plans to participate in [facility] Meds to [facility] (Y/N): N    Pharmacist Review and Summary of Medications     Date of last reviewed/update: 4/3/19     Category Comments   New Medication Started   1. Lisinopril 5 mg PO nightly  2. Doxycycline hyclate 100 mg PO BID x 7 days         Change in Outpatient Medication  (Dosage Form, Route,   Dose, or Frequency) 1. Metoprolol succinate 25 mg PO daily increased to 50 mg PO daily          [id number] Outpatient Medication   (or on Hold During Admission) 1. Other              Pharmacist Patient Education:    Date  Person Educated Content of Education    4/3/19 Patient/wife Lisinopril, doxycycline -       Documentation of Pharmacist Interventions and Follow-up Plan:     The following Pharmacist Transition of Care Services were completed:   [x]  Reviewed and summarized medication changes  []  Entire home medication list was reviewed for accuracy (sources: **)  []  Home medication list was updated or corrected     [x]  Patient education was provided on new medications  [x]  Patient education was provided on medication changes  [x]  Reviewed the After Visit Summary (AVS) with patient    Additional Interventions:  []  Inpatient prescriber was contacted and the following pharmacy recommendations        were accepted: **     [] Other interventions: **        Pharmacist: Linh Caceres PharmD, [facility]  Date:  4/3/2019 1:39 PM  Time spent counseling on medications: 20 minutes

## 2019-04-12 LAB
EKG ATRIAL RATE: 47 BPM
EKG P AXIS: 1 DEGREES
EKG P-R INTERVAL: 176 MS
EKG Q-T INTERVAL: 532 MS
EKG QRS DURATION: 152 MS
EKG QTC CALCULATION (BAZETT): 470 MS
EKG R AXIS: -33 DEGREES
EKG T AXIS: -108 DEGREES
EKG VENTRICULAR RATE: 47 BPM

## 2019-04-17 ENCOUNTER — OFFICE VISIT (OUTPATIENT)
Dept: CARDIOLOGY CLINIC | Age: 79
End: 2019-04-17
Payer: MEDICARE

## 2019-04-17 VITALS
HEART RATE: 51 BPM | WEIGHT: 148 LBS | RESPIRATION RATE: 16 BRPM | BODY MASS INDEX: 20.72 KG/M2 | HEIGHT: 71 IN | SYSTOLIC BLOOD PRESSURE: 132 MMHG | DIASTOLIC BLOOD PRESSURE: 60 MMHG

## 2019-04-17 DIAGNOSIS — I47.29 NONSUSTAINED VENTRICULAR TACHYCARDIA: Primary | ICD-10-CM

## 2019-04-17 DIAGNOSIS — I36.1 NON-RHEUMATIC TRICUSPID VALVE INSUFFICIENCY: ICD-10-CM

## 2019-04-17 DIAGNOSIS — I34.2 NON-RHEUMATIC MITRAL VALVE STENOSIS: ICD-10-CM

## 2019-04-17 DIAGNOSIS — I27.20 PULMONARY HTN (HCC): ICD-10-CM

## 2019-04-17 DIAGNOSIS — Z87.440 H/O RECURRENT URINARY TRACT INFECTION: ICD-10-CM

## 2019-04-17 DIAGNOSIS — Z87.448 H/O HEMATURIA: ICD-10-CM

## 2019-04-17 DIAGNOSIS — R00.1 SINUS BRADYCARDIA: ICD-10-CM

## 2019-04-17 DIAGNOSIS — I42.8 NON-ISCHEMIC CARDIOMYOPATHY (HCC): ICD-10-CM

## 2019-04-17 DIAGNOSIS — Z98.890 HISTORY OF TRANSURETHRAL RESECTION OF PROSTATE: ICD-10-CM

## 2019-04-17 DIAGNOSIS — Z90.79 HISTORY OF TRANSURETHRAL RESECTION OF PROSTATE: ICD-10-CM

## 2019-04-17 DIAGNOSIS — I44.7 LBBB (LEFT BUNDLE BRANCH BLOCK): ICD-10-CM

## 2019-04-17 DIAGNOSIS — I35.1 NONRHEUMATIC AORTIC VALVE INSUFFICIENCY: ICD-10-CM

## 2019-04-17 DIAGNOSIS — Z95.810 IMPLANTABLE CARDIOVERTER-DEFIBRILLATOR (ICD) IN SITU: ICD-10-CM

## 2019-04-17 DIAGNOSIS — Z98.890 H/O CYSTOSCOPY: ICD-10-CM

## 2019-04-17 DIAGNOSIS — N32.3 BLADDER DIVERTICULUM: ICD-10-CM

## 2019-04-17 DIAGNOSIS — Z87.898 H/O URINARY RETENTION: ICD-10-CM

## 2019-04-17 DIAGNOSIS — Z98.890 H/O MITRAL VALVE REPAIR: ICD-10-CM

## 2019-04-17 PROCEDURE — 99204 OFFICE O/P NEW MOD 45 MIN: CPT | Performed by: INTERNAL MEDICINE

## 2019-04-17 PROCEDURE — 93000 ELECTROCARDIOGRAM COMPLETE: CPT | Performed by: INTERNAL MEDICINE

## 2019-04-17 RX ORDER — ACETAMINOPHEN 500 MG
1000 TABLET ORAL EVERY 6 HOURS PRN
COMMUNITY
End: 2022-06-13

## 2019-04-17 RX ORDER — LISINOPRIL 10 MG/1
10 TABLET ORAL NIGHTLY
Qty: 30 TABLET | Refills: 11 | Status: SHIPPED
Start: 2019-04-17 | End: 2020-02-19

## 2019-04-17 NOTE — PROGRESS NOTES
OFFICE VISIT        PRIMARY CARE PHYSICIAN:    Nu Salinas DO       ALLERGIES / SENSITIVITIES:    Allergies   Allergen Reactions    Shrimp (Diagnostic)         REVIEWED MEDICATIONS:      Current Outpatient Medications:     acetaminophen (TYLENOL) 500 MG tablet, Take 1,000 mg by mouth every 6 hours as needed for Pain, Disp: , Rfl:     lisinopril (PRINIVIL;ZESTRIL) 10 MG tablet, Take 1 tablet by mouth nightly, Disp: 30 tablet, Rfl: 11    metoprolol succinate (TOPROL XL) 50 MG extended release tablet, Take 1 tablet by mouth daily, Disp: 30 tablet, Rfl: 3      S: REASON FOR VISIT:    To establish a cardiology follow up. Cam is a pleasant, 53-year-old male who is here today to establish cardiology follow up. He has a history of mitral valve repair surgery at [facility] in [place] in [address] [place] [address]. He was diagnosed with a nonsustained ventricular tachycardia and was taken care of by Dr. Jhon Son in 12/2018. He was started on Lisinopril and metoprolol. An echocardiogram showed moderate to mitral stenosis without mitral regurgitation and with an ejection fraction of 45-50%. He subsequently underwent an electrophysiologic study again by Dr. Jhon Son on 4/2/2019, which showed easily inducible sustained polymorphic ventricular tachycardia degenerating into ventricular fibrillation and accordingly, he underwent the placement of dual chamber pacer ICD. Cam denies any chest pain or any significant exertional dyspnea. He denies orthopnea, PND's or lower extremity swelling. He denies palpitations, presyncope or syncope or discharges from his ICD. REVIEW OF SYSTEMS:    CONSTITUTIONAL:  Denies fevers, chills, night sweats or fatigue. HEENT:  Denies any unusual headaches. Denies recent changes in hearing or vision. Denies dysphagia, hoarseness, hemoptysis, hematemesis or epistaxis. ENDOCRINE:  Denies polyphagia, polydipsia or polyuria. Denies heat or cold intolerance.   MUSCULOSKELETAL:  Denies any significant arthralgias or myalgias. SKIN:  Denies rashes, ulcers or itching. HEME/LYMPH:  Denies any palpable lymph nodes, bleeding or easy bruisability. HEART:  As above. LUNGS:  Denies cough or sputum production. GI: Denies abdominal pain, nausea, vomiting, diarrhea, constipation, rectal bleeding or tarry stools. :  Denies hematuria or dysuria. PSYCHIATRIC:  Denies mood changes, anxiety or depression. NEUROLOGIC:  Denies memory loss, motor weakness, numbness, tingling or tremors. PAST MEDICAL/SURGICAL HISTORY:    1. History of prostate enlargement, S/P TURP 7/9/2018, after which the patient had problems with recurrent urinary retention, urinary infection and hematuria and underwent cystoscopy on 10/12/2018 and again in 01/2019 at [facility], at which time scar tissue was removed. He also has bladder diverticulum. 2.  S/P mitral valve repair surgery at [facility] in 18.  3.  Cardiomyopathy. 4.  Left bundle branch block. 5.  Echocardiogram done on 12/6/2018, was reported as showing normal left ventricular size with mild concentric left ventricular hypertrophy with paradoxical septal motion consistent with left bundle branch block with an estimated ejection fraction of 45-50%. Normal right ventricular size and function, moderately dilated left atrium and mildly dilated right atrium. Moderate mitral annular calcification, S/P mitral annular ring insertion with moderate mitral stenosis with no evidence of mitral regurgitation. Mild-to-moderate tricuspid regurgitation. Mild pulmonary hypertension with an RVSP of 40 mmHg. Moderately sclerotic aortic valve with mild aortic regurgitation. 6.  Nonsustained ventricular tachycardia. a. Inducible sustained polymorphic VT degenerating into ventricular fibrillation on electrophysiologic testing.    b.  Insertion of dual chamber pacemaker ICD, 4/2/2019.       FAMILY HISTORY:  Mother living at age 80 with no significant health issues. Father  post hip surgery at age 80: Thought to be heart related. SOCIAL HISTORY:  Patient does not smoke. He drinks an occasional glass of wine. He denies any illicit drug use. O:  COMPLETE PHYSICAL EXAM:      /60   Pulse 51   Resp 16   Ht 5' 11\" (1.803 m)   Wt 148 lb (67.1 kg)   BMI 20.64 kg/m²      General:  Healthy-appearing male in no acute distress. HEENT: Normocephalic and atraumatic head. No JVD. No carotid bruits. Carotid upstrokes normal bilaterally. No thyromegaly. Sclerae not icteric. No xanthelasmas. Mucous membranes moist.  Chest:  Symmetrical and nontender. Old sternotomy scar well-healed. ICD scar well-healed. Lungs:  Clear to auscultation bilaterally. Heart:  Normal S1 and S2. No S3 or S4. No murmurs or rubs. Abdomen: Soft, nontender without organomegaly or masses. No bruits. Normal bowel sounds. Extremities: No edema. Skin:  Normal turgor. No rashes or ulcers noted. Neurologic: Oriented x3. No motor or sensory deficits detected. REVIEW OF DIAGNOSTIC TESTS:    1. Blood tests from 4/3/2019 reviewed:  BUN 19, creatinine 1.0, potassium 3.9, GFR >60, CBC unremarkable. 2.  EKG done today showed sinus bradycardia with left bundle branch block. ASSESSMENT / DIAGNOSIS:   1. Valvular heart disease, S/P mitral valve repair in  with echocardiogram done in 2018 showing moderate mitral stenosis with no mitral regurgitation, mild aortic regurgitation, mild to moderate tricuspid regurgitation and mild pulmonary hypertension. 2. Cardiomyopathy, presumed nonischemic. 3. History of nonsustained ventricular tachycardia, S/P dual chamber ICD. 4. History of benign prostatic hypertrophy, s/P TURP with repeated cystoscopy with scar removal after patient had problems with urinary retention, hematuria and recurrent bladder infections. 5. Bladder diverticulum. TREATMENT PLAN:  1. Reassure.    2.  Increase Lisinopril to 10 mg daily. 3.  Lexiscan nuclear stress test.   4.  Follow up with Cardiology in 6 months or on a prn basis pending the results of the stress test.          [facility] CARDIOLOG[facility]  [address] [place] [address] [place] [address] [id number] [phone]

## 2019-04-17 NOTE — PATIENT INSTRUCTIONS
Patient Education        A Healthy Heart: Care Instructions  Your Care Instructions    Heart disease occurs when a substance called plaque builds up in the vessels that supply oxygen-rich blood to your heart. This can narrow the blood vessels and reduce blood flow. A heart attack happens when blood flow is completely blocked. A high-fat diet, smoking, and other factors increase the risk of heart disease. Your doctor has found that you have a chance of having heart disease. You can do lots of things to keep your heart healthy. It may not be easy, but you can change your diet, exercise more, and quit smoking. These steps really work to lower your chance of heart disease. Follow-up care is a key part of your treatment and safety. Be sure to make and go to all appointments, and call your doctor if you are having problems. It's also a good idea to know your test results and keep a list of the medicines you take. How can you care for yourself at home? Diet    · Use less salt when you cook and eat. This helps lower your blood pressure. Taste food before salting. Add only a little salt when you think you need it. With time, your taste buds will adjust to less salt.     · Eat fewer snack items, fast foods, canned soups, and other high-salt, high-fat, processed foods.     · Read food labels and try to avoid saturated and trans fats. They increase your risk of heart disease by raising cholesterol levels.     · Limit the amount of solid fat-butter, margarine, and shortening-you eat. Use olive, peanut, or canola oil when you cook. Bake, broil, and steam foods instead of frying them.     · Eating fish can lower your risk for heart disease. Eat at least 2 servings of fish a week. [place], mackerel, herring, sardines, and chunk light tuna are very good choices. These fish contain omega-3 fatty acids.     · Eat a variety of fruit and vegetables every day.  Dark green, deep orange, red, or yellow fruits and vegetables are especially good for you. Examples include spinach, carrots, peaches, and berries.     · Foods high in fiber can reduce your cholesterol and provide important vitamins and minerals. High-fiber foods include whole-grain cereals and breads, oatmeal, beans, brown rice, citrus fruits, and apples.     · Limit drinks and foods with added sugar. These include candy, desserts, and soda pop.    Lifestyle changes    · If your doctor recommends it, get more exercise. Walking is a good choice. Bit by bit, increase the amount you walk every day. Try for at least 30 minutes on most days of the week. You also may want to swim, bike, or do other activities.     · Do not smoke. If you need help quitting, talk to your doctor about stop-smoking programs and medicines. These can increase your chances of quitting for good. Quitting smoking may be the most important step you can take to protect your heart. It is never too late to quit. You will get health benefits right away.     · Limit alcohol to 2 drinks a day for men and 1 drink a day for women. Too much alcohol can cause health problems. Medicines    · Take your medicines exactly as prescribed. Call your doctor if you think you are having a problem with your medicine.     · If your doctor recommends aspirin, take the amount directed each day. Make sure you take aspirin and not another kind of pain reliever, such as acetaminophen (Tylenol). If you take ibuprofen (such as Advil or Motrin) for other problems, take aspirin at least 2 hours before taking ibuprofen. When should you call for help? Call 911 if you have symptoms of a heart attack.  These may include:    · Chest pain or pressure, or a strange feeling in the chest.     · Sweating.     · Shortness of breath.     · Pain, pressure, or a strange feeling in the back, neck, jaw, or upper belly or in one or both shoulders or arms.     · Lightheadedness or sudden weakness.     · A fast or irregular heartbeat.    After you call 911, the  may tell you to chew 1 adult-strength or 2 to 4 low-dose aspirin. Wait for an ambulance. Do not try to drive yourself.   Watch closely for changes in your health, and be sure to contact your doctor if you have any problems. Where can you learn more? Go to https://chpepiceweb.[employer]. org and sign in to your [employer] account. Enter I427 in the [employer] box to learn more about \"A Healthy Heart: Care Instructions. \"     If you do not have an account, please click on the \"Sign Up Now\" link. Current as of: July 22, 2018  Content Version: 11.9  © 1849-3780 [employer]. Care instructions adapted under license by [facility] ([facility]). If you have questions about a medical condition or this instruction, always ask your healthcare professional. [address] any warranty or liability for your use of this information.

## 2019-05-01 ENCOUNTER — HOSPITAL ENCOUNTER (OUTPATIENT)
Dept: CARDIOLOGY | Age: 79
Discharge: HOME | End: 2019-05-01
Payer: MEDICARE

## 2019-05-01 VITALS
HEIGHT: 71 IN | HEART RATE: 56 BPM | SYSTOLIC BLOOD PRESSURE: 130 MMHG | DIASTOLIC BLOOD PRESSURE: 80 MMHG | BODY MASS INDEX: 20.72 KG/M2 | WEIGHT: 148 LBS

## 2019-05-01 DIAGNOSIS — I47.29 NONSUSTAINED VENTRICULAR TACHYCARDIA: ICD-10-CM

## 2019-05-01 PROCEDURE — 78452 HT MUSCLE IMAGE SPECT MULT: CPT

## 2019-05-01 PROCEDURE — 93017 CV STRESS TEST TRACING ONLY: CPT

## 2019-05-01 PROCEDURE — 6360000002 HC RX W HCPCS: Performed by: INTERNAL MEDICINE

## 2019-05-01 PROCEDURE — 93018 CV STRESS TEST I&R ONLY: CPT | Performed by: INTERNAL MEDICINE

## 2019-05-01 PROCEDURE — 3430000000 HC RX DIAGNOSTIC RADIOPHARMACEUTICAL: Performed by: INTERNAL MEDICINE

## 2019-05-01 PROCEDURE — 93016 CV STRESS TEST SUPVJ ONLY: CPT | Performed by: INTERNAL MEDICINE

## 2019-05-01 PROCEDURE — A9502 TC99M TETROFOSMIN: HCPCS | Performed by: INTERNAL MEDICINE

## 2019-05-01 PROCEDURE — 2580000003 HC RX 258: Performed by: INTERNAL MEDICINE

## 2019-05-01 RX ORDER — SODIUM CHLORIDE 0.9 % (FLUSH) 0.9 %
10 SYRINGE (ML) INJECTION PRN
Status: DISCONTINUED | OUTPATIENT
Start: 2019-05-01 | End: 2019-05-02 | Stop reason: HOSPADM

## 2019-05-01 RX ADMIN — Medication 10 ML: at 11:00

## 2019-05-01 RX ADMIN — Medication 10 ML: at 11:11

## 2019-05-01 RX ADMIN — REGADENOSON 0.4 MG: 0.08 INJECTION, SOLUTION INTRAVENOUS at 11:11

## 2019-05-01 RX ADMIN — TETROFOSMIN 7.9 MILLICURIE: 0.23 INJECTION, POWDER, LYOPHILIZED, FOR SOLUTION INTRAVENOUS at 09:13

## 2019-05-01 RX ADMIN — TETROFOSMIN 24.3 MILLICURIE: 0.23 INJECTION, POWDER, LYOPHILIZED, FOR SOLUTION INTRAVENOUS at 11:11

## 2019-05-01 RX ADMIN — Medication 10 ML: at 09:12

## 2019-05-01 NOTE — PROCEDURES
[id number] [facility] and V[facility] Lab - [address]  [phone]               Pharmacologic Stress Nuclear Gated SPECT Study    Name: [address] [place] [address] Account Number: [de-identified]    :  1940          Sex: male         Date of Study:  2019    Height: 5' 11\" (180.3 cm)         Weight: 148 lb (67.1 kg)     Ordering Provider: Claudia Pelletier          PCP: Donna Henao DO      Cardiologist: Claudia Pelletier             Interpreting Physician: Sharmila Curtis MD  _________________________________________________________________________________    Indication:   Detecting the presence and location of coronary artery disease    Clinical History:   Patient has no known history of coronary artery disease. Resting ECG:    SB with LBBB    Procedure:   Pharmacologic stress testing was performed with regadenoson 0.4 mg for 15 seconds. The heart rate was 50 at baseline and judith to 60 beats during the infusion. The blood pressure at baseline was 152/80 and blood pressure at the end of infusion was 140/80. Blood pressure response was normal during the stress procedure. The patient tolerated the infusion well. ECG during the infusion did not change. IMAGING: Myocardial perfusion imaging was performed at rest 30-35 minutes following the intravenous injection of 7.9 mCi of (Tc-tetrofosmin) followed by 10 ml of Normal Saline. As per infusion protocol, the patient was injected intravenously with 24.3 mCi of (Tc-tetrofosmin) followed by 10 ml of Normal Saline. Gated post-stress tomographic imaging was performed 45 minutes after stress. FINDINGS: The overall quality of the study was good. Left ventricular cavity size was noted to be normal.    Rotational analog analysis demonstrated abnormal patient motion.     A moderate defect was present in the anteroseptal and the apical anterior wall(s) that was  small size on the post regadenoson images     There also was a moderate defect present in the inferior and basal inferolateral wall(s) that was  moderate size    The resting images are show no change. Gated SPECT left ventricular ejection fraction was calculated to be 60% with normal myocardial thickening and dyskinesis of the apical septal wall     Impression:    1. Electrocardiographically non diagnostic regadenoson infusion because of the LBBB  2. Myocardial perfusion imaging was normal with attenuation artifact. 3. Overall left ventricular systolic function was normal with apical septal dyskinesis. 4. Low risk general pharmacologic stress test    Thank you for sending your patient to this [employer].

## 2019-05-13 ENCOUNTER — TELEPHONE (OUTPATIENT)
Dept: CARDIOLOGY CLINIC | Age: 79
End: 2019-05-13

## 2019-10-18 ENCOUNTER — TELEPHONE (OUTPATIENT)
Dept: NON INVASIVE DIAGNOSTICS | Age: 79
End: 2019-10-18

## 2019-12-04 ENCOUNTER — OFFICE VISIT (OUTPATIENT)
Dept: CARDIOLOGY CLINIC | Age: 79
End: 2019-12-04
Payer: MEDICARE

## 2019-12-04 VITALS
BODY MASS INDEX: 20.72 KG/M2 | HEIGHT: 71 IN | DIASTOLIC BLOOD PRESSURE: 82 MMHG | RESPIRATION RATE: 16 BRPM | HEART RATE: 51 BPM | SYSTOLIC BLOOD PRESSURE: 138 MMHG | WEIGHT: 148 LBS

## 2019-12-04 DIAGNOSIS — I38 VHD (VALVULAR HEART DISEASE): ICD-10-CM

## 2019-12-04 DIAGNOSIS — Z90.79 HISTORY OF TRANSURETHRAL RESECTION OF PROSTATE: ICD-10-CM

## 2019-12-04 DIAGNOSIS — I44.7 LBBB (LEFT BUNDLE BRANCH BLOCK): ICD-10-CM

## 2019-12-04 DIAGNOSIS — G89.29 CHRONIC RIGHT-SIDED LOW BACK PAIN WITH RIGHT-SIDED SCIATICA: ICD-10-CM

## 2019-12-04 DIAGNOSIS — Z95.810 IMPLANTABLE CARDIOVERTER-DEFIBRILLATOR (ICD) IN SITU: ICD-10-CM

## 2019-12-04 DIAGNOSIS — M54.41 CHRONIC RIGHT-SIDED LOW BACK PAIN WITH RIGHT-SIDED SCIATICA: ICD-10-CM

## 2019-12-04 DIAGNOSIS — I42.8 NON-ISCHEMIC CARDIOMYOPATHY (HCC): ICD-10-CM

## 2019-12-04 DIAGNOSIS — I47.29 NONSUSTAINED VENTRICULAR TACHYCARDIA: Primary | ICD-10-CM

## 2019-12-04 DIAGNOSIS — Z98.890 HISTORY OF TRANSURETHRAL RESECTION OF PROSTATE: ICD-10-CM

## 2019-12-04 DIAGNOSIS — Z98.890 H/O MITRAL VALVE REPAIR: ICD-10-CM

## 2019-12-04 PROCEDURE — 93000 ELECTROCARDIOGRAM COMPLETE: CPT | Performed by: INTERNAL MEDICINE

## 2019-12-04 PROCEDURE — 99213 OFFICE O/P EST LOW 20 MIN: CPT | Performed by: INTERNAL MEDICINE

## 2019-12-04 RX ORDER — PANTOPRAZOLE SODIUM 20 MG/1
20 TABLET, DELAYED RELEASE ORAL DAILY PRN
COMMUNITY
End: 2022-06-13

## 2020-02-19 RX ORDER — LISINOPRIL 10 MG/1
TABLET ORAL
Qty: 90 TABLET | Refills: 3 | Status: SHIPPED
Start: 2020-02-19 | End: 2021-05-27 | Stop reason: SDUPTHER

## 2021-03-03 ENCOUNTER — IMMUNIZATION (OUTPATIENT)
Dept: PRIMARY CARE CLINIC | Age: 81
End: 2021-03-03
Payer: MEDICARE

## 2021-03-03 PROCEDURE — 0001A COVID-19, PFIZER VACCINE 30MCG/0.3ML DOSE: CPT | Performed by: NURSE PRACTITIONER

## 2021-03-03 PROCEDURE — 91300 COVID-19, PFIZER VACCINE 30MCG/0.3ML DOSE: CPT | Performed by: NURSE PRACTITIONER

## 2021-04-01 ENCOUNTER — IMMUNIZATION (OUTPATIENT)
Dept: PRIMARY CARE CLINIC | Age: 81
End: 2021-04-01
Payer: MEDICARE

## 2021-04-01 PROCEDURE — 0002A COVID-19, PFIZER VACCINE 30MCG/0.3ML DOSE: CPT | Performed by: NURSE PRACTITIONER

## 2021-04-01 PROCEDURE — 91300 COVID-19, PFIZER VACCINE 30MCG/0.3ML DOSE: CPT | Performed by: NURSE PRACTITIONER

## 2021-05-27 RX ORDER — LISINOPRIL 10 MG/1
TABLET ORAL
Qty: 90 TABLET | Refills: 3 | Status: SHIPPED
Start: 2021-05-27 | End: 2022-05-25 | Stop reason: SDUPTHER

## 2021-08-18 ENCOUNTER — OFFICE VISIT (OUTPATIENT)
Dept: CARDIOLOGY CLINIC | Age: 81
End: 2021-08-18
Payer: MEDICARE

## 2021-08-18 VITALS
OXYGEN SATURATION: 99 % | DIASTOLIC BLOOD PRESSURE: 78 MMHG | BODY MASS INDEX: 20.1 KG/M2 | SYSTOLIC BLOOD PRESSURE: 110 MMHG | RESPIRATION RATE: 16 BRPM | HEIGHT: 71 IN | WEIGHT: 143.6 LBS | HEART RATE: 56 BPM

## 2021-08-18 DIAGNOSIS — I47.29 NONSUSTAINED VENTRICULAR TACHYCARDIA: Primary | ICD-10-CM

## 2021-08-18 DIAGNOSIS — Z90.79 H/O TRANSURETHRAL RESECTION OF PROSTATE: ICD-10-CM

## 2021-08-18 DIAGNOSIS — Z98.890 H/O TRANSURETHRAL RESECTION OF PROSTATE: ICD-10-CM

## 2021-08-18 DIAGNOSIS — M54.41 CHRONIC RIGHT-SIDED LOW BACK PAIN WITH RIGHT-SIDED SCIATICA: ICD-10-CM

## 2021-08-18 DIAGNOSIS — I38 VHD (VALVULAR HEART DISEASE): ICD-10-CM

## 2021-08-18 DIAGNOSIS — I44.0 FIRST DEGREE ATRIOVENTRICULAR BLOCK: ICD-10-CM

## 2021-08-18 DIAGNOSIS — N32.3 BLADDER DIVERTICULUM: ICD-10-CM

## 2021-08-18 DIAGNOSIS — Z95.810 S/P ICD (INTERNAL CARDIAC DEFIBRILLATOR) PROCEDURE: ICD-10-CM

## 2021-08-18 DIAGNOSIS — Z98.890 HISTORY OF MITRAL VALVE REPAIR: ICD-10-CM

## 2021-08-18 DIAGNOSIS — Z86.79 H/O CARDIOMYOPATHY: ICD-10-CM

## 2021-08-18 DIAGNOSIS — G89.29 CHRONIC RIGHT-SIDED LOW BACK PAIN WITH RIGHT-SIDED SCIATICA: ICD-10-CM

## 2021-08-18 DIAGNOSIS — Z86.19 HISTORY OF SHINGLES: ICD-10-CM

## 2021-08-18 DIAGNOSIS — I44.7 LBBB (LEFT BUNDLE BRANCH BLOCK): ICD-10-CM

## 2021-08-18 PROBLEM — R39.89 URETHRAL PAIN: Status: ACTIVE | Noted: 2019-07-25

## 2021-08-18 PROBLEM — N40.0 BPH WITHOUT OBSTRUCTION/LOWER URINARY TRACT SYMPTOMS: Status: ACTIVE | Noted: 2021-08-18

## 2021-08-18 PROBLEM — E78.00 PURE HYPERCHOLESTEROLEMIA: Status: ACTIVE | Noted: 2021-08-18

## 2021-08-18 PROBLEM — R31.0 GROSS HEMATURIA: Status: ACTIVE | Noted: 2019-07-25

## 2021-08-18 PROCEDURE — 99214 OFFICE O/P EST MOD 30 MIN: CPT | Performed by: INTERNAL MEDICINE

## 2021-08-18 PROCEDURE — 93000 ELECTROCARDIOGRAM COMPLETE: CPT | Performed by: INTERNAL MEDICINE

## 2021-08-18 RX ORDER — IBUPROFEN 200 MG
200 TABLET ORAL EVERY 6 HOURS PRN
COMMUNITY
End: 2022-06-13

## 2021-08-18 NOTE — PROGRESS NOTES
OFFICE VISIT        PRIMARY CARE PHYSICIAN:    Tamy Carty DO     ALLERGIES / SENSITIVITIES:    Allergies   Allergen Reactions    Iodine Swelling     Facial Swelling and Throat Closes Up    Shrimp (Diagnostic)     Shrimp Extract Allergy Skin Test Swelling      REVIEWED MEDICATIONS:      Current Outpatient Medications:     ibuprofen (ADVIL;MOTRIN) 200 MG tablet, Take 200 mg by mouth every 6 hours as needed for Pain, Disp: , Rfl:     lisinopril (PRINIVIL;ZESTRIL) 10 MG tablet, TAKE ONE TABLET NIGHTLY, Disp: 90 tablet, Rfl: 3    metoprolol succinate (TOPROL XL) 50 MG extended release tablet, Take 1 tablet by mouth daily, Disp: 30 tablet, Rfl: 3    Diclofenac Sodium (VOLTAREN PO), Take by mouth as needed, Disp: , Rfl:     pantoprazole (PROTONIX) 20 MG tablet, Take 20 mg by mouth daily as needed, Disp: , Rfl:     acetaminophen (TYLENOL) 500 MG tablet, Take 1,000 mg by mouth every 6 hours as needed for Pain, Disp: , Rfl:     S: REASON FOR VISIT:    History of valvular heart disease and cardiomyopathy, status post mitral valve repair in 1995. History of nonsustained ventricular tachycardia and inducible sustained polymorphic ventricular tachycardia degenerating into ventricular fibrillation on electrophysiologic testing status post dual chamber ICD implantation in 04/2019. Cam is a pleasant 53-year-old gentleman with cardiovascular history as described below. He follows with Electrophysiology, Dr. Cisco Condon, in regards to his ICD implantation routinely and with phone checks. He states that he feels generally good and denies chest pain or dyspnea with his daily activities. He avoids hot humid weather. He denies orthopnea, PNDs or significant lower extremity swelling. He denies the subjective feeling of palpitations. He denies dizziness, presyncope, syncope or discharges from his ICD. He takes Voltaren and Motrin only on a prn basis for occasional right-sided low back pain with right-sided sciatica.   He had shingles around 3 weeks ago and was given antiviral medications and gabapentin. REVIEW OF SYSTEMS:    CONSTITUTIONAL:  Denies fevers, chills, night sweats or fatigue. HEENT:  Denies any unusual headaches.  Denies recent changes in hearing or vision.  Denies dysphagia, hoarseness, hemoptysis, hematemesis or epistaxis. ENDOCRINE:  Denies polyphagia, polydipsia or polyuria.  Denies heat or cold intolerance. MUSCULOSKELETAL:  He has right-sided low back pain with right-sided lower extremity sciatica. SKIN:  Denies rashes, ulcers or itching. HEME/LYMPH:  Denies any palpable lymph nodes, bleeding or easy bruisability. HEART:  As above. LUNGS:  Denies cough or sputum production. GI: Denies abdominal pain, nausea, vomiting, diarrhea, constipation, rectal bleeding or tarry stools. :  Denies hematuria or dysuria. PSYCHIATRIC:  Denies mood changes, anxiety or depression. NEUROLOGIC:  Denies memory loss, motor weakness, numbness, tingling or tremors.      PAST MEDICAL/SURGICAL HISTORY:    1.  History of prostate enlargement, S/P TURP 7/9/2018, after which the patient had problems with recurrent urinary retention, urinary infection and hematuria and underwent cystoscopy on 10/12/2018 and again in 01/2019 at [facility], at which time scar tissue was removed. He also has bladder diverticulum.    2.  S/P mitral valve repair surgery at [facility] in [address] [place] [address]. 3.  Cardiomyopathy. 4.  Left bundle branch block.    5.  Echocardiogram done on 12/6/2018, was reported as showing normal left ventricular size with mild concentric left ventricular hypertrophy with paradoxical septal motion consistent with left bundle branch block with an estimated ejection fraction of 45-50%.  Normal right ventricular size and function, moderately dilated left atrium and mildly dilated right atrium.  Moderate mitral annular calcification, S/P mitral annular ring insertion with moderate mitral stenosis with no evidence of mitral regurgitation.  Mild-to-moderate tricuspid regurgitation.  Mild pulmonary hypertension with an RVSP of 40 mmHg. Moderately sclerotic aortic valve with mild aortic regurgitation.    6.  Nonsustained ventricular tachycardia. a.  Inducible sustained polymorphic VT degenerating into ventricular fibrillation on electrophysiologic testing.    b.  Insertion of dual chamber pacemaker ICD, 2019.    7.  Lattie Weott nuclear stress test done on 2019 showed normal myocardial perfusion images with attenuation artifacts with no convincing evidence of scar or stress-induced ischemia with the gated views showing apical septal dyskinesis, with a calculated ejection fraction of 60%. 8.  Right-sided lower back pain with right lower extremity sciatica. 9.  Right flank/mid back area shingles 2021, treated with antiviral medications and gabapentin.     FAMILY HISTORY:  Mother living at age 80 with no significant health issues.    Father  post hip surgery at age 80: Thought to be heart related.      SOCIAL HISTORY:  Patient does not smoke.  He drinks an occasional glass of wine.  He denies any illicit drug use. O:  COMPLETE PHYSICAL EXAM:      /78 (Site: Right Upper Arm, Position: Sitting, Cuff Size: Medium Adult)   Pulse 56   Resp 16   Ht 5' 11\" (1.803 m)   Wt 143 lb 9.6 oz (65.1 kg)   SpO2 99%   BMI 20.03 kg/m²      General:          Healthy-appearing male in no acute distress. HEENT:           Normocephalic and atraumatic head. No JVD. No carotid bruits. Carotid upstrokes normal bilaterally.  No thyromegaly.  Sclerae not icteric.  No xanthelasmas.  Mucous membranes moist.  Chest:              Symmetrical and nontender.  Old sternotomy scar well-healed.  ICD scar well-healed. Lungs:             Clear to auscultation bilaterally. Heart:              Normal S1 and S2.  No S3 or S4.  Grade 2/6 systolic murmur heard at the apex/axilla.     Abdomen:        Soft, nontender without organomegaly or masses.  No bruits. Normal bowel sounds. Extremities:     Trace edema. Skin:                Normal turgor. No rashes or ulcers noted. Neurologic:      Oriented x3.  No motor or sensory deficits detected.                  REVIEW OF DIAGNOSTIC TESTS:    -EKG done today showed sinus bradycardia with first-degree AV block with ectopic beats and with LBBB.                 ASSESSMENT / DIAGNOSIS:   1. Valvular heart disease, S/P mitral valve repair in 1995 with echocardiogram done in 12/2018 showing moderate mitral stenosis with no mitral regurgitation, mild aortic regurgitation, mild to moderate tricuspid regurgitation and mild pulmonary hypertension. 2. History of cardiomyopathy, presumed nonischemic.       3. History of nonsustained ventricular tachycardia, S/P dual chamber ICD. 4. Left bundle branch block. 5. History of benign prostatic hypertrophy, s/P TURP with repeated cystoscopy with scar removal after patient had problems with urinary retention, hematuria and recurrent bladder infections. 6. Bladder diverticulum.    7. Right-sided low back pain with right lower extremity sciatica. 8. Right flank area/right mid back area shingles in 07/2021. TREATMENT PLAN:  1. Reassure. 2.  Continue current medications. 3.  Patient advised to take Voltaren and Motrin only sparingly. 4.  Echocardiogram for follow-up valvular heart disease. 5.  Routine EP follow up (as per EP). 6.  Follow up with Cardiology in one year or on prn basis (pending the results of the echocardiogram). Philip [address] [place] [id number]  [phone]

## 2022-05-13 ENCOUNTER — TELEPHONE (OUTPATIENT)
Dept: CARDIOLOGY | Age: 82
End: 2022-05-13

## 2022-05-13 NOTE — TELEPHONE ENCOUNTER
Called patient to reschedule his echo. I had to l/m.  Thank you Electronically signed by Yessi Hunter on 5/13/2022 at 10:22 AM

## 2022-05-25 RX ORDER — LISINOPRIL 10 MG/1
TABLET ORAL
Qty: 90 TABLET | Refills: 3 | Status: ON HOLD
Start: 2022-05-25 | End: 2022-06-15 | Stop reason: HOSPADM

## 2022-06-12 ENCOUNTER — HOSPITAL ENCOUNTER (OUTPATIENT)
Age: 82
Setting detail: OBSERVATION
Discharge: HOME | End: 2022-06-15
Attending: EMERGENCY MEDICINE | Admitting: INTERNAL MEDICINE
Payer: MEDICARE

## 2022-06-12 ENCOUNTER — APPOINTMENT (OUTPATIENT)
Dept: GENERAL RADIOLOGY | Age: 82
End: 2022-06-12
Payer: MEDICARE

## 2022-06-12 DIAGNOSIS — N17.9 ACUTE KIDNEY INJURY (HCC): Primary | ICD-10-CM

## 2022-06-12 DIAGNOSIS — R53.1 GENERAL WEAKNESS: ICD-10-CM

## 2022-06-12 LAB
ALBUMIN SERPL-MCNC: 4.4 G/DL (ref 3.5–5.2)
ALP BLD-CCNC: 46 U/L (ref 40–129)
ALT SERPL-CCNC: 20 U/L (ref 0–40)
ANION GAP SERPL CALCULATED.3IONS-SCNC: 12 MMOL/L (ref 7–16)
AST SERPL-CCNC: 26 U/L (ref 0–39)
BASOPHILS ABSOLUTE: 0.02 E9/L (ref 0–0.2)
BASOPHILS RELATIVE PERCENT: 0.3 % (ref 0–2)
BILIRUB SERPL-MCNC: 0.4 MG/DL (ref 0–1.2)
BUN BLDV-MCNC: 41 MG/DL (ref 6–23)
CALCIUM SERPL-MCNC: 8.7 MG/DL (ref 8.6–10.2)
CHLORIDE BLD-SCNC: 106 MMOL/L (ref 98–107)
CO2: 23 MMOL/L (ref 22–29)
CREAT SERPL-MCNC: 1.8 MG/DL (ref 0.7–1.2)
EOSINOPHILS ABSOLUTE: 0.32 E9/L (ref 0.05–0.5)
EOSINOPHILS RELATIVE PERCENT: 5.5 % (ref 0–6)
GFR AFRICAN AMERICAN: 44
GFR NON-AFRICAN AMERICAN: 37 ML/MIN/1.73
GLUCOSE BLD-MCNC: 81 MG/DL (ref 74–99)
HCT VFR BLD CALC: 34.2 % (ref 37–54)
HEMOGLOBIN: 11 G/DL (ref 12.5–16.5)
IMMATURE GRANULOCYTES #: 0.01 E9/L
IMMATURE GRANULOCYTES %: 0.2 % (ref 0–5)
LYMPHOCYTES ABSOLUTE: 2.35 E9/L (ref 1.5–4)
LYMPHOCYTES RELATIVE PERCENT: 40.2 % (ref 20–42)
MCH RBC QN AUTO: 30.6 PG (ref 26–35)
MCHC RBC AUTO-ENTMCNC: 32.2 % (ref 32–34.5)
MCV RBC AUTO: 95.3 FL (ref 80–99.9)
MONOCYTES ABSOLUTE: 0.51 E9/L (ref 0.1–0.95)
MONOCYTES RELATIVE PERCENT: 8.7 % (ref 2–12)
NEUTROPHILS ABSOLUTE: 2.63 E9/L (ref 1.8–7.3)
NEUTROPHILS RELATIVE PERCENT: 45.1 % (ref 43–80)
PDW BLD-RTO: 14.2 FL (ref 11.5–15)
PLATELET # BLD: 174 E9/L (ref 130–450)
PMV BLD AUTO: 11 FL (ref 7–12)
POTASSIUM SERPL-SCNC: 5.1 MMOL/L (ref 3.5–5)
RBC # BLD: 3.59 E12/L (ref 3.8–5.8)
SODIUM BLD-SCNC: 141 MMOL/L (ref 132–146)
TOTAL PROTEIN: 6.6 G/DL (ref 6.4–8.3)
TROPONIN, HIGH SENSITIVITY: 34 NG/L (ref 0–11)
WBC # BLD: 5.8 E9/L (ref 4.5–11.5)

## 2022-06-12 PROCEDURE — 85025 COMPLETE CBC W/AUTO DIFF WBC: CPT

## 2022-06-12 PROCEDURE — 93005 ELECTROCARDIOGRAM TRACING: CPT | Performed by: EMERGENCY MEDICINE

## 2022-06-12 PROCEDURE — 2580000003 HC RX 258: Performed by: EMERGENCY MEDICINE

## 2022-06-12 PROCEDURE — 84484 ASSAY OF TROPONIN QUANT: CPT

## 2022-06-12 PROCEDURE — 96360 HYDRATION IV INFUSION INIT: CPT

## 2022-06-12 PROCEDURE — 71045 X-RAY EXAM CHEST 1 VIEW: CPT

## 2022-06-12 PROCEDURE — G0378 HOSPITAL OBSERVATION PER HR: HCPCS

## 2022-06-12 PROCEDURE — 99285 EMERGENCY DEPT VISIT HI MDM: CPT

## 2022-06-12 PROCEDURE — 80053 COMPREHEN METABOLIC PANEL: CPT

## 2022-06-12 RX ORDER — 0.9 % SODIUM CHLORIDE 0.9 %
1000 INTRAVENOUS SOLUTION INTRAVENOUS ONCE
Status: COMPLETED | OUTPATIENT
Start: 2022-06-12 | End: 2022-06-12

## 2022-06-12 RX ADMIN — SODIUM CHLORIDE 1000 ML: 9 INJECTION, SOLUTION INTRAVENOUS at 21:42

## 2022-06-12 ASSESSMENT — PAIN - FUNCTIONAL ASSESSMENT: PAIN_FUNCTIONAL_ASSESSMENT: NONE - DENIES PAIN

## 2022-06-13 ENCOUNTER — APPOINTMENT (OUTPATIENT)
Dept: ULTRASOUND IMAGING | Age: 82
End: 2022-06-13
Payer: MEDICARE

## 2022-06-13 LAB
ANION GAP SERPL CALCULATED.3IONS-SCNC: 11 MMOL/L (ref 7–16)
ANION GAP SERPL CALCULATED.3IONS-SCNC: 15 MMOL/L (ref 7–16)
BASOPHILS ABSOLUTE: 0.03 E9/L (ref 0–0.2)
BASOPHILS RELATIVE PERCENT: 0.7 % (ref 0–2)
BILIRUBIN URINE: NEGATIVE
BLOOD, URINE: NEGATIVE
BUN BLDV-MCNC: 33 MG/DL (ref 6–23)
BUN BLDV-MCNC: 34 MG/DL (ref 6–23)
CALCIUM SERPL-MCNC: 8.4 MG/DL (ref 8.6–10.2)
CALCIUM SERPL-MCNC: 8.6 MG/DL (ref 8.6–10.2)
CHLORIDE BLD-SCNC: 107 MMOL/L (ref 98–107)
CHLORIDE BLD-SCNC: 109 MMOL/L (ref 98–107)
CLARITY: CLEAR
CO2: 17 MMOL/L (ref 22–29)
CO2: 22 MMOL/L (ref 22–29)
COLOR: YELLOW
CREAT SERPL-MCNC: 1.4 MG/DL (ref 0.7–1.2)
CREAT SERPL-MCNC: 1.4 MG/DL (ref 0.7–1.2)
CREATININE URINE: 76 MG/DL (ref 40–278)
EOSINOPHILS ABSOLUTE: 0.29 E9/L (ref 0.05–0.5)
EOSINOPHILS RELATIVE PERCENT: 6.7 % (ref 0–6)
GFR AFRICAN AMERICAN: 59
GFR AFRICAN AMERICAN: 59
GFR NON-AFRICAN AMERICAN: 49 ML/MIN/1.73
GFR NON-AFRICAN AMERICAN: 49 ML/MIN/1.73
GLUCOSE BLD-MCNC: 108 MG/DL (ref 74–99)
GLUCOSE BLD-MCNC: 168 MG/DL (ref 74–99)
GLUCOSE URINE: NEGATIVE MG/DL
HCT VFR BLD CALC: 35.9 % (ref 37–54)
HEMOGLOBIN: 11.2 G/DL (ref 12.5–16.5)
IMMATURE GRANULOCYTES #: 0.01 E9/L
IMMATURE GRANULOCYTES %: 0.2 % (ref 0–5)
KETONES, URINE: NEGATIVE MG/DL
LEUKOCYTE ESTERASE, URINE: NEGATIVE
LYMPHOCYTES ABSOLUTE: 1.6 E9/L (ref 1.5–4)
LYMPHOCYTES RELATIVE PERCENT: 36.8 % (ref 20–42)
MCH RBC QN AUTO: 30.5 PG (ref 26–35)
MCHC RBC AUTO-ENTMCNC: 31.2 % (ref 32–34.5)
MCV RBC AUTO: 97.8 FL (ref 80–99.9)
MONOCYTES ABSOLUTE: 0.34 E9/L (ref 0.1–0.95)
MONOCYTES RELATIVE PERCENT: 7.8 % (ref 2–12)
NEUTROPHILS ABSOLUTE: 2.08 E9/L (ref 1.8–7.3)
NEUTROPHILS RELATIVE PERCENT: 47.8 % (ref 43–80)
NITRITE, URINE: NEGATIVE
PDW BLD-RTO: 14.1 FL (ref 11.5–15)
PH UA: 6 (ref 5–9)
PLATELET # BLD: 158 E9/L (ref 130–450)
PMV BLD AUTO: 11.3 FL (ref 7–12)
POTASSIUM REFLEX MAGNESIUM: 4.7 MMOL/L (ref 3.5–5)
POTASSIUM SERPL-SCNC: 4.4 MMOL/L (ref 3.5–5)
PRO-BNP: 1895 PG/ML (ref 0–450)
PROTEIN UA: NEGATIVE MG/DL
RBC # BLD: 3.67 E12/L (ref 3.8–5.8)
SODIUM BLD-SCNC: 140 MMOL/L (ref 132–146)
SODIUM BLD-SCNC: 141 MMOL/L (ref 132–146)
SODIUM URINE: 101 MMOL/L
SPECIFIC GRAVITY UA: 1.02 (ref 1–1.03)
TROPONIN, HIGH SENSITIVITY: 30 NG/L (ref 0–11)
UROBILINOGEN, URINE: 0.2 E.U./DL
WBC # BLD: 4.4 E9/L (ref 4.5–11.5)

## 2022-06-13 PROCEDURE — 6370000000 HC RX 637 (ALT 250 FOR IP)

## 2022-06-13 PROCEDURE — 84484 ASSAY OF TROPONIN QUANT: CPT

## 2022-06-13 PROCEDURE — 2580000003 HC RX 258

## 2022-06-13 PROCEDURE — G0378 HOSPITAL OBSERVATION PER HR: HCPCS

## 2022-06-13 PROCEDURE — 81003 URINALYSIS AUTO W/O SCOPE: CPT

## 2022-06-13 PROCEDURE — 84300 ASSAY OF URINE SODIUM: CPT

## 2022-06-13 PROCEDURE — 99215 OFFICE O/P EST HI 40 MIN: CPT | Performed by: INTERNAL MEDICINE

## 2022-06-13 PROCEDURE — 93283 PRGRMG EVAL IMPLANTABLE DFB: CPT | Performed by: INTERNAL MEDICINE

## 2022-06-13 PROCEDURE — 82570 ASSAY OF URINE CREATININE: CPT

## 2022-06-13 PROCEDURE — 36415 COLL VENOUS BLD VENIPUNCTURE: CPT

## 2022-06-13 PROCEDURE — 83880 ASSAY OF NATRIURETIC PEPTIDE: CPT

## 2022-06-13 PROCEDURE — 80048 BASIC METABOLIC PNL TOTAL CA: CPT

## 2022-06-13 PROCEDURE — 76770 US EXAM ABDO BACK WALL COMP: CPT

## 2022-06-13 PROCEDURE — APPSS60 APP SPLIT SHARED TIME 46-60 MINUTES

## 2022-06-13 PROCEDURE — 85025 COMPLETE CBC W/AUTO DIFF WBC: CPT

## 2022-06-13 RX ORDER — ENOXAPARIN SODIUM 100 MG/ML
30 INJECTION SUBCUTANEOUS DAILY
Status: DISCONTINUED | OUTPATIENT
Start: 2022-06-14 | End: 2022-06-13

## 2022-06-13 RX ORDER — POLYETHYLENE GLYCOL 3350 17 G/17G
17 POWDER, FOR SOLUTION ORAL DAILY PRN
Status: DISCONTINUED | OUTPATIENT
Start: 2022-06-13 | End: 2022-06-15 | Stop reason: HOSPADM

## 2022-06-13 RX ORDER — PANTOPRAZOLE SODIUM 20 MG/1
20 TABLET, DELAYED RELEASE ORAL
Status: DISCONTINUED | OUTPATIENT
Start: 2022-06-13 | End: 2022-06-15 | Stop reason: HOSPADM

## 2022-06-13 RX ORDER — ENOXAPARIN SODIUM 100 MG/ML
40 INJECTION SUBCUTANEOUS DAILY
Status: DISCONTINUED | OUTPATIENT
Start: 2022-06-13 | End: 2022-06-13

## 2022-06-13 RX ORDER — ENOXAPARIN SODIUM 100 MG/ML
40 INJECTION SUBCUTANEOUS DAILY
Status: DISCONTINUED | OUTPATIENT
Start: 2022-06-14 | End: 2022-06-15 | Stop reason: HOSPADM

## 2022-06-13 RX ORDER — ONDANSETRON 2 MG/ML
4 INJECTION INTRAMUSCULAR; INTRAVENOUS EVERY 6 HOURS PRN
Status: DISCONTINUED | OUTPATIENT
Start: 2022-06-13 | End: 2022-06-13

## 2022-06-13 RX ORDER — ACETAMINOPHEN 650 MG/1
650 SUPPOSITORY RECTAL EVERY 6 HOURS PRN
Status: DISCONTINUED | OUTPATIENT
Start: 2022-06-13 | End: 2022-06-15

## 2022-06-13 RX ORDER — LISINOPRIL 10 MG/1
10 TABLET ORAL DAILY
Status: DISCONTINUED | OUTPATIENT
Start: 2022-06-13 | End: 2022-06-15 | Stop reason: HOSPADM

## 2022-06-13 RX ORDER — SODIUM CHLORIDE 9 MG/ML
INJECTION, SOLUTION INTRAVENOUS PRN
Status: DISCONTINUED | OUTPATIENT
Start: 2022-06-13 | End: 2022-06-15 | Stop reason: HOSPADM

## 2022-06-13 RX ORDER — SODIUM CHLORIDE 0.9 % (FLUSH) 0.9 %
5-40 SYRINGE (ML) INJECTION PRN
Status: DISCONTINUED | OUTPATIENT
Start: 2022-06-13 | End: 2022-06-15 | Stop reason: HOSPADM

## 2022-06-13 RX ORDER — METOPROLOL SUCCINATE 50 MG/1
50 TABLET, EXTENDED RELEASE ORAL NIGHTLY
Status: DISCONTINUED | OUTPATIENT
Start: 2022-06-14 | End: 2022-06-15 | Stop reason: HOSPADM

## 2022-06-13 RX ORDER — SODIUM CHLORIDE 0.9 % (FLUSH) 0.9 %
5-40 SYRINGE (ML) INJECTION EVERY 12 HOURS SCHEDULED
Status: DISCONTINUED | OUTPATIENT
Start: 2022-06-13 | End: 2022-06-15 | Stop reason: HOSPADM

## 2022-06-13 RX ORDER — METOPROLOL SUCCINATE 50 MG/1
50 TABLET, EXTENDED RELEASE ORAL DAILY
Status: DISCONTINUED | OUTPATIENT
Start: 2022-06-13 | End: 2022-06-13

## 2022-06-13 RX ORDER — ACETAMINOPHEN 325 MG/1
650 TABLET ORAL EVERY 6 HOURS PRN
Status: DISCONTINUED | OUTPATIENT
Start: 2022-06-13 | End: 2022-06-15

## 2022-06-13 RX ORDER — ONDANSETRON 4 MG/1
4 TABLET, ORALLY DISINTEGRATING ORAL EVERY 8 HOURS PRN
Status: DISCONTINUED | OUTPATIENT
Start: 2022-06-13 | End: 2022-06-13

## 2022-06-13 RX ADMIN — SODIUM CHLORIDE, PRESERVATIVE FREE 10 ML: 5 INJECTION INTRAVENOUS at 09:23

## 2022-06-13 RX ADMIN — PANTOPRAZOLE SODIUM 20 MG: 20 TABLET, DELAYED RELEASE ORAL at 09:46

## 2022-06-13 RX ADMIN — SODIUM CHLORIDE, PRESERVATIVE FREE 10 ML: 5 INJECTION INTRAVENOUS at 21:26

## 2022-06-13 NOTE — ED PROVIDER NOTES
HPI:  6/12/22, Time: 8:06 PM EDT         Spencer Malloy is a 80 y.o. male presenting to the ED for history of fatigue and shortness of breath and weakness, beginning months ago. The complaint has been persistent, moderate in severity, and worsened by nothing. Presents because of feeling weak he states has been short of breath and weak for the last several months. Patient was at a graduation party today and noted that his heart rate was low. Patient family reports has been fatigued and he was stumbling. He has had no slurred speech no headache he had no syncopal event he reports no chest pain he reports no abdominal pain or vomiting or diarrhea. Patient reporting no black or tarry stools. Patient reporting no lower extremity weakness. Patient does have a [employer] AICD. Patient reports it was checked about a month ago. Patient reporting no urinary symptoms. ROS:   Pertinent positives and negatives are stated within HPI, all other systems reviewed and are negative.  --------------------------------------------- PAST HISTORY ---------------------------------------------  Past Medical History:  has a past medical history of Arthritis, Cardiomyopathy ([facility] [address] Hematuria syndrome, LBBB (left bundle branch block), Mitral valve disease, NSVT (nonsustained ventricular tachycardia) ([facility] [address] Pulmonary HTN ([facility] [address] and Pure hypercholesterolemia. Past Surgical History:  has a past surgical history that includes TURP; pr x-ray retrograde pyelogram (N/A, 10/12/2018); and Mitral valve repair (1995). Social History:  reports that he has never smoked. He has never used smokeless tobacco. He reports that he does not drink alcohol and does not use drugs. Family History: family history includes Coronary Art Dis in his father; Pacemaker in his father. The patients home medications have been reviewed.     Allergies: Iodine, Shrimp (diagnostic), and Shrimp extract allergy skin test    ---------------------------------------------------PHYSICAL EXAM--------------------------------------    Constitutional/General: Alert and oriented x3, well appearing, non toxic in NAD  Head: Normocephalic and atraumatic  Eyes: PERRL, EOMI  Mouth: Oropharynx clear, handling secretions, no trismus  Neck: Supple, full ROM, non tender to palpation in the midline, no stridor, no crepitus, no meningeal signs  Pulmonary: Lungs clear to auscultation bilaterally, no wheezes, rales, or rhonchi. Not in respiratory distress  Cardiovascular:  Regular rate. Regular rhythm. No murmurs, gallops, or rubs. 2+ distal pulses  Chest: no chest wall tenderness  Abdomen: Soft. Non tender. Non distended. +BS. No rebound, guarding, or rigidity. No pulsatile masses appreciated. Musculoskeletal: Moves all extremities x 4. Warm and well perfused, no clubbing, cyanosis, or edema. Capillary refill <3 seconds  Skin: warm and dry. No rashes. Neurologic: GCS 15, CN 2-12 grossly intact, no focal deficits, symmetric strength 5/5 in the upper and lower extremities bilaterally  Psych: Normal Affect    -------------------------------------------------- RESULTS -------------------------------------------------  I have personally reviewed all laboratory and imaging results for this patient. Results are listed below.      LABS:  Results for orders placed or performed during the hospital encounter of 06/12/22   CBC with Auto Differential   Result Value Ref Range    WBC 5.8 4.5 - 11.5 E9/L    RBC 3.59 (L) 3.80 - 5.80 E12/L    Hemoglobin 11.0 (L) 12.5 - 16.5 g/dL    Hematocrit 34.2 (L) 37.0 - 54.0 %    MCV 95.3 80.0 - 99.9 fL    MCH 30.6 26.0 - 35.0 pg    MCHC 32.2 32.0 - 34.5 %    RDW 14.2 11.5 - 15.0 fL    Platelets 624 212 - 084 E9/L    MPV 11.0 7.0 - 12.0 fL    Neutrophils % 45.1 43.0 - 80.0 %    Immature Granulocytes % 0.2 0.0 - 5.0 %    Lymphocytes % 40.2 20.0 - 42.0 %    Monocytes % 8.7 2.0 - 12.0 %    Eosinophils % 5.5 0.0 - 6.0 % Basophils % 0.3 0.0 - 2.0 %    Neutrophils Absolute 2.63 1.80 - 7.30 E9/L    Immature Granulocytes # 0.01 E9/L    Lymphocytes Absolute 2.35 1.50 - 4.00 E9/L    Monocytes Absolute 0.51 0.10 - 0.95 E9/L    Eosinophils Absolute 0.32 0.05 - 0.50 E9/L    Basophils Absolute 0.02 0.00 - 0.20 E9/L   Comprehensive Metabolic Panel   Result Value Ref Range    Sodium 141 132 - 146 mmol/L    Potassium 5.1 (H) 3.5 - 5.0 mmol/L    Chloride 106 98 - 107 mmol/L    CO2 23 22 - 29 mmol/L    Anion Gap 12 7 - 16 mmol/L    Glucose 81 74 - 99 mg/dL    BUN 41 (H) 6 - 23 mg/dL    CREATININE 1.8 (H) 0.7 - 1.2 mg/dL    GFR Non-African American 37 >=60 mL/min/1.73    GFR African American 44     Calcium 8.7 8.6 - 10.2 mg/dL    Total Protein 6.6 6.4 - 8.3 g/dL    Albumin 4.4 3.5 - 5.2 g/dL    Total Bilirubin 0.4 0.0 - 1.2 mg/dL    Alkaline Phosphatase 46 40 - 129 U/L    ALT 20 0 - 40 U/L    AST 26 0 - 39 U/L   Troponin   Result Value Ref Range    Troponin, High Sensitivity 34 (H) 0 - 11 ng/L       RADIOLOGY:  Interpreted by Radiologist.  XR CHEST PORTABLE   Final Result   No evidence of pneumonia or pleural effusion. EKG:  This EKG is signed and interpreted by me. Rate: 64  Rhythm: Pacer spikes noted with PVCs  Interpretation: non-specific EKG  Comparison: Compared to previous            ------------------------- NURSING NOTES AND VITALS REVIEWED ---------------------------   The nursing notes within the ED encounter and vital signs as below have been reviewed by myself. BP (!) 154/72   Pulse 50   Temp 98 °F (36.7 °C)   Resp 20   Ht 5' 7\" (1.702 m)   Wt 143 lb (64.9 kg)   SpO2 97%   BMI 22.40 kg/m²   Oxygen Saturation Interpretation: Normal    The patients available past medical records and past encounters were reviewed.         ------------------------------ ED COURSE/MEDICAL DECISION MAKING----------------------  Medications   0.9 % sodium chloride bolus (1,000 mLs IntraVENous New Bag 6/12/22 7219)             Medical Decision Making:   Presenting here because of feeling fatigued and weak this amount ongoing for months but worsened today while he was at a party today. She was noted to be bradycardic. Patient reporting no chest pain he reports ongoing shortness of breath. Patient reporting no slurred speech she reports no syncopal event. There is no vomiting or diarrhea abdominal pain. Patient had his AICD interrogated. Patient pacer is working correctly. I did speak with Wilmer Blackwood and discussed case patient to be admitted for observation she will follow-up with the patient in the morning. Patient also has noted kidney injury. He reports he has been seen by [facility] for his kidney function test.  Patient reports he stopped taking Motrin. Patient will be given IV hydration. And plan will be to admit to monitored bed I did speak to on-call internal medicine     Re-Evaluations:             Re-evaluation. Patients symptoms show no change  Patient reevaluated and unchanged. Patient made aware of findings and results and plan    Consultations:             Internal medicine as well as electrophysiology    Critical Care: This patient's ED course included: a personal history and physicial eaxmination    This patient has been closely monitored during their ED course. Counseling: The emergency provider has spoken with the patient and discussed todays results, in addition to providing specific details for the plan of care and counseling regarding the diagnosis and prognosis. Questions are answered at this time and they are agreeable with the plan.       --------------------------------- IMPRESSION AND DISPOSITION ---------------------------------    IMPRESSION  1. Acute kidney injury ([facility] [address]    2. General weakness        DISPOSITION  Disposition: Admit to telemetry  Patient condition is stable        NOTE: This report was transcribed using voice recognition software.  Every effort was made to ensure accuracy; however, inadvertent computerized transcription errors may be present          Tod Dupont MD  06/12/22 1610

## 2022-06-13 NOTE — ACP (ADVANCE CARE PLANNING)
Advance Care Planning     Advance Care Planning Activator (Inpatient)  Conversation Note      Date of ACP Conversation: 6/13/2022     Conversation Conducted with: {Discussion Participants:71453::\"Patient with Decision Making Capacity\"}    ACP Activator: JENNIFER Dao    {When Decision Maker makes decisions on behalf of the incapacitated patient: Decision Maker is asked to consider and make decisions based on patient values, known preferences, or best interests. [facility]):41263}    Health Care Decision Maker:     Current Designated Health Care Decision Maker:     Click here to complete Healthcare Decision Makers including section of the Healthcare Decision Maker Relationship (ie \"Primary\")  {Select if Healthcare Decision Makers in ACP Activity was empty/incomplete (Optional):[id number]}    Care Preferences    Ventilation: \"If you were in your present state of health and suddenly became very ill and were unable to breathe on your own, what would your preference be about the use of a ventilator (breathing machine) if it were available to you? \"      Would the patient desire the use of ventilator (breathing machine)?: {Yes/No-Ex:118398}    \"If your health worsens and it becomes clear that your chance of recovery is unlikely, what would your preference be about the use of a ventilator (breathing machine) if it were available to you? \"     Would the patient desire the use of ventilator (breathing machine)?: {YES/NO:21137}      Resuscitation  \"CPR works best to restart the heart when there is a sudden event, like a heart attack, in someone who is otherwise healthy. Unfortunately, CPR does not typically restart the heart for people who have serious health conditions or who are very sick. \"    \"In the event your heart stopped as a result of an underlying serious health condition, would you want attempts to be made to restart your heart (answer \"yes\" for attempt to resuscitate) or would you prefer a natural death (answer \"no\" for do not attempt to resuscitate)? \" {Yes/No-Ex:001768}       [] Yes   [] No   Educated Patient / Franny Apgar regarding differences between Advance Directives and portable DNR orders. Length of ACP Conversation in minutes:      Conversation Outcomes:  [] ACP discussion completed  [] Existing advance directive reviewed with patient; no changes to patient's previously recorded wishes  [] New Advance Directive completed  [] Portable Do Not Rescitate prepared for Provider review and signature  [] POLST/POST/MOLST/MOST prepared for Provider review and signature      Follow-up plan:    [] Schedule follow-up conversation to continue planning  [] Referred individual to Provider for additional questions/concerns   [] Advised patient/agent/surrogate to review completed ACP document and update if needed with changes in condition, patient preferences or care setting    [] This note routed to one or more involved healthcare providers    {If the relationship to the patient does NOT follow your state's Next of Kin hierarchy, the patient must complete an ACP document to allow him/her to act on the patient's behalf. (Optional):98703}    {If the patient has a valid advance directive AND verbally provides inconsistent care preference(s), advise the patient to either create a new advance directive or to orally revoke that prior directive.  (Optional):07166}

## 2022-06-13 NOTE — CARE COORDINATION
Care Coordination: met with pt and wife at bedside for transition of care upon discharge. Discussed observation status. Per wife, echo today and then stress test tomorrow. Lives with wife in a 2 story home, his wife is Jackeline   And his new cell is [phone]. [place]ses Home town  In [place], follow with Dr Walker Spears, wife will transport home.  No hx of hhc, dme or carloz, does not anticipate any home going needs completely independent    Debbie Apodaca

## 2022-06-13 NOTE — H&P
Hospitalist History & Physical      PCP: Eleni Diertich DO    Date of Service: Pt seen/examined on 6/12/2022         Placed in Observation. Chief Complaint:  had concerns including Fatigue (low pulse at 30 bpm per ems states having SOB/ weaknesss, HX pacemaker). History of Present Illness:    Mr. Axel Renteria, a 80y.o. year old male  who  has a past medical history of Arthritis, Cardiomyopathy ([address] Hematuria syndrome, LBBB (left bundle branch block), Mitral valve disease, NSVT (nonsustained ventricular tachycardia) ([address] Pulmonary HTN ([address] and Pure hypercholesterolemia. Patient presents to ER secondary for weakness and fatigue complaint is moderate in severity worsened by activity and relieved by rest.  Patient also admits to visual changes, \"when things became hazy\" and reports that he almost felt like he was going to pass out, over the last few days. Allegedly, per family while they were at a graduation party,  patient become progressively more weak and his heart rate was in the 30s. Upon returning home, patient's wife checked patient's heart rate with a pulse ox and pulse showed to be in 30s. Per patient, patient's wife then called Dr. Armando Frederick who immediately called back and asked them to send her a rhythm strip. Patient was then instructed to call EMS and report to the emergency department. Patient was admitted to observation with a consult for EP for further monitoring and evaluation. ER COURSE:  Patient's ER course significant for a BMP with an elevated potassium of 5.1, creatinine of 1.8 and elevated troponin at 34. CBC notable for a slightly low hemoglobin of 11 and hematocrit of 34.2. Chest x-ray shows no evidence of pneumonia or pleural effusions. EKG showed paced rhythm with PACs and PVCs with a rate in the 50s. ER physician reached out to EP that sees patient.   Past Medical History:        Diagnosis Date    Arthritis     Cardiomyopathy ([address]     Hematuria syndrome w/UTI--[facility]    LBBB (left bundle branch block)     Mitral valve disease     [facility]ust[facility]cardia[facility] ([facility])     Pulmonary HTN ([address]     Pure hypercholesterolemia 8/18/2021       Past Surgical History:        Procedure Laterality Date    MITR[facility]PAIR  1995     [facility]    NM X-RAY RETROGRADE PYELOGRAM N/A 10/12/2018    CYSTOSCOPY, URETHRAL DILATATION, INSERTION OF SINGER  by Christiano MONTES Taran     TRANSURETHRAL RESECTION OF PROSTATE      7/9/2018       Medications Prior to Admission:      Prior to Admission medications    Medication Sig Start Date End Date Taking? Authorizing Provider   lisinopril (PRINIVIL;ZESTRIL) 10 MG tablet TAKE ONE TABLET NIGHTLY 5/25/22   Bushra Holley MD   ibuprofen (ADVIL;MOTRIN) 200 MG tablet Take 200 mg by mouth every 6 hours as needed for Pain    Historical Provider, MD   Diclofenac Sodium (VOLTAREN PO) Take by mouth as needed    Historical Provider, MD   pantoprazole (PROTONIX) 20 MG tablet Take 20 mg by mouth daily as needed    Historical Provider, MD   acetaminophen (TYLENOL) 500 MG tablet Take 1,000 mg by mouth every 6 hours as needed for Pain    Historical Provider, MD   metoprolol succinate (TOPROL XL) 50 MG extended release tablet Take 1 tablet by mouth daily 4/4/19   Hudson Redmond MD       Allergies:  Iodine, Shrimp (diagnostic), and Shrimp extract allergy skin test    Social History:    RESIDENCE: Home  TOBACCO:   reports that he has never smoked. He has never used smokeless tobacco.  ETOH:   reports no history of alcohol use. Family History:    Reviewed in detail and negative for DM, CAD, Cancer, CVA. Positive as follows\"      Problem Relation Age of Onset    Coronary Art Dis Father     Pacemaker Father        Review of Systems: All bolded are positive; please see HPI  General:  Fever, chills, diaphoresis, fatigue, malaise, night sweats, weight loss  Psychological:  Anxiety, disorientation, hallucinations.   ENT:  Epistaxis, headaches, vertigo, visual changes. Cardiovascular:  Chest pain, irregular heartbeats, palpitations, paroxysmal nocturnal dyspnea. Respiratory:  Shortness of breath, coughing, sputum production, hemoptysis, wheezing, orthopnea. Gastrointestinal:  Nausea, vomiting, diarrhea, heartburn, constipation, abdominal pain, hematemesis, hematochezia, melena, acholic stools  Genito-Urinary:  Dysuria, urgency, frequency, hematuria  Musculoskeletal:  Joint pain, joint stiffness, joint swelling, muscle pain  Neurology:  Headache, focal neurological deficits, weakness, numbness, paresthesia  Derm:  Rashes, ulcers, excoriations, bruising  Extremities:  Decreased ROM, peripheral edema, mottling    Physical Exam:  BP (!) 154/72   Pulse 50   Temp 98 °F (36.7 °C)   Resp 20   Ht 5' 7\" (1.702 m)   Wt 143 lb (64.9 kg)   SpO2 97%   BMI 22.40 kg/m²   General appearance: Elderly male in no apparent distress, appears stated age and cooperative. Respiratory:  Clear to auscultation bilaterally. Normal respiratory effort. Cardiovascular:  RRR. S1, S2 with systolic murmur  PV: Pulses palpable. No edema. Abdomen: Soft, non-tender, non-distended. +BS  Musculoskeletal: No obvious deformities. Skin: Normal skin color. No rashes or lesions. Good turgor. Neurologic:  Grossly non-focal. Awake, alert, following commands. Psychiatric: Alert and oriented, thought content appropriate, normal insight and judgement    Reviewed EKG and CXR personally      CBC:   Recent Labs     06/12/22 2041   WBC 5.8   RBC 3.59*   HGB 11.0*   HCT 34.2*   MCV 95.3   RDW 14.2        BMP:   Recent Labs     06/12/22 2041      K 5.1*      CO2 23   BUN 41*   CREATININE 1.8*     LFT:  Recent Labs     06/12/22 2041   PROT 6.6   ALKPHOS 46   ALT 20   AST 26   BILITOT 0.4     CE:  No results for input(s): Gold Lager in the last 72 hours. PT/INR: No results for input(s): INR, APTT in the last 72 hours.   BNP: No results for input(s): BNP in the last 72 hours.  ESR: No results found for: SEDRATE  CRP: No results found for: CRP  D Dimer: No results found for: DDIMER   Folate and B12: No results found for: [id number], No results found for: FOLATE  Lactic Acid: No results found for: LACTA  Thyroid Studies:   Lab Results   Component Value Date    TSH 1.940 12/06/2018       Oupatient labs:  Lab Results   Component Value Date    TSH 1.940 12/06/2018       Urinalysis:  No results found for: NITRU, WBCUA, BACTERIA, RBCUA, BLOODU, SPECGRAV, GLUCOSEU    Imaging:  XR CHEST PORTABLE    Result Date: 6/12/2022  No evidence of pneumonia or pleural effusion. Assessment/Plan:    History of nonsustained ventricular tachycardia, S/P dual chamber ICD  · Consult electrophysiology, recommendations appreciated  · Consult cardiology, recommendations appreciated    Valvular heart disease, S/P mitral valve repair in 1995   · Echocardiogram done in 12/2018 showing moderate mitral stenosis with no mitral regurgitation, mild aortic regurgitation, mild to moderate tricuspid regurgitation and mild pulmonary hypertension. Abnormal (paradoxical) motion consistent with left bundle branch block.  Ejection fraction is visually estimated at 45-50%  · Echo ordered    Elevated troponin, 34 on presentation  · Follow troponin x3    Cardiomyopathy   · Continue home medication lisinopril 10 mg      History of benign prostatic hypertrophy, s/P TURP with repeated cystoscopy with scar removal after patient had problems with urinary retention, hematuria and recurrent bladder infections  · Monitor for signs of urine retention  · Consider urology consult if patient unable to void    WILL in the setting of CKD, creatinine 1.8 on presentation baseline seems to be 1.0, patient states that this has been a reoccurring issue and he follows with a nephrologist at [facility]  · Consider nephrology consult  · Follow BMP  · Urine studies ordered  · Renal ultrasound ordered    Hyperkalemia   · Monitor for now, follow BMP                          Diet: No diet orders on file  Code Status: Prior  Surrogate decision maker confirmed with patient:   Extended Emergency Contact Information  Primary Emergency Contact: [facility]  Address: [address] Phone: [phone]  Mobile Phone: [phone]  Relation: Spouse  Secondary Emergency Contact: Yenny Chan [address] Phone: [phone]  Mobile Phone: [phone]  Relation: Child    DVT Prophylaxis: []Lovenox []Heparin []PCD [] [address] []Encouraged ambulation  Disposition: []Med/Surg [] Intermediate [] ICU/CCU  Admit status: [] Observation [] Inpatient     +++++++++++++++++++++++++++++++++++++++++++++++++  Alek Martínez paras[address]  +++++++++++++++++++++++++++++++++++++++++++++++++  NOTE: This report was transcribed using voice recognition software. Every effort was made to ensure accuracy; however, inadvertent computerized transcription errors may be present.

## 2022-06-13 NOTE — PROGRESS NOTES
Hospitalist Progress Note      Synopsis: Patient admitted on 6/12/2022 for bradycardia and lightheadedness. Subjective    Clinically improving. Feeling better. Stable overnight. No other overnight issues reported. No CP, SOB, palpitations, blurred vision, HA, lightheadedness, LOC or focal neurological deficits    Exam:  /76   Pulse 61   Temp 97.5 °F (36.4 °C) (Oral)   Resp 18   Ht 5' 7\" (1.702 m)   Wt 143 lb (64.9 kg)   SpO2 100%   BMI 22.40 kg/m²   General appearance: No apparent distress, appears stated age and cooperative. HEENT: Pupils equal, round, and reactive to light. Conjunctivae/corneas clear. Neck: Supple. No jugular venous distention. Trachea midline. Respiratory:  Normal respiratory effort. Clear to auscultation, bilaterally without Rales/Wheezes/Rhonchi. Cardiovascular: Regular rate and rhythm with normal S1/S2 without murmurs, rubs or gallops. Abdomen: Soft, non-tender, non-distended with normal bowel sounds. Musculoskeletal: No clubbing, cyanosis or edema bilaterally. Brisk capillary refill. 2+ lower extremity pulses (dorsalis pedis).    Skin:  No rashes    Neurologic: awake, alert and following commands     Medications:  Reviewed    Infusion Medications    sodium chloride       Scheduled Medications    pantoprazole  20 mg Oral QAM AC    [Held by provider] lisinopril  10 mg Oral Daily    sodium chloride flush  5-40 mL IntraVENous 2 times per day    [START ON 6/14/2022] metoprolol succinate  50 mg Oral Nightly     PRN Meds: sodium chloride flush, sodium chloride, polyethylene glycol, acetaminophen **OR** acetaminophen, perflutren lipid microspheres, [START ON 6/14/2022] regadenoson    I/O    Intake/Output Summary (Last 24 hours) at 6/13/2022 1817  Last data filed at 6/13/2022 1407  Gross per 24 hour   Intake 480 ml   Output --   Net 480 ml       Labs:   Recent Labs     06/12/22  2041 06/13/22  1026   WBC 5.8 4.4*   HGB 11.0* 11.2*   HCT 34.2* 35.9*    158 Recent Labs     06/12/22 2041 06/13/22  1025    141   K 5.1* 4.7    109*   CO2 23 17*   BUN 41* 34*   CREATININE 1.8* 1.4*   CALCIUM 8.7 8.6       Recent Labs     06/12/22 2041   PROT 6.6   ALKPHOS 46   ALT 20   AST 26   BILITOT 0.4       No results for input(s): INR in the last 72 hours. No results for input(s): Raciel Geo in the last 72 hours. Chronic labs:  Lab Results   Component Value Date    TSH 1.940 12/06/2018       Radiology:  Imaging studies reviewed today. ASSESSMENT:    Principal Problem:    Weakness  Active Problems:    Acute kidney injury ([facility] [address]  Resolved Problems:    * No resolved hospital problems. *    1. Generalized weakness/lightheadedness  -Sounds orthostatic in nature.  -Patient received IV fluid bolus in the emergency department. Feels slightly better.  -Currently, denies any dizziness or generalized weakness. 2.  Dyspnea on exertion/bradycardia/elevated troponin  Patient evaluated by electrophysiology as well as general cardiology. They are recommending an echocardiogram as well as a stress test during hospitalization. We will continue to monitor. Chest x-ray shows mild cardiomegaly but no airspace opacity or pleural effusions. No pneumothorax. Continue to monitor closely. 3.  Acute renal failure  Patient's baseline appears to be 1.35 according to documentation from electrophysiology. Creatinine was 1.8 on admission. Hold lisinopril. Patient was given fluid bolus and creatinine has improved to 1.4. Avoid nephrotoxic medications and continue to monitor closely. 4.  Nonsustained V. Tach  Electrophysiology consulted. Patient did have episode of polymorphic V. fib an electrophysiology study. Continue metoprolol. Continue to monitor on telemetry. 5.  Valvular heart disease, status post mitral valve repair in 1995  Repeat echocardiogram ordered. 6.  BPH  Will get postvoid residuals. 7.  Hyperkalemia  Resolved.   Continue to monitor. PLAN:        Diet: ADULT DIET; Regular; No Added Salt (3-4 gm)  Diet NPO  Code Status: Full Code  DVT Prophylaxis:    Lovenox  Recommended disposition at discharge:   Possibly discharged home after echocardiogram and stress test tomorrow.    +++++++++++++++++++++++++++++++++++++++++++++++++  Frankey Duran, [facility].  +++++++++++++++++++++++++++++++++++++++++++++++++  NOTE: This report was transcribed using voice recognition software. Every effort was made to ensure accuracy; however, inadvertent computerized transcription errors may be present.

## 2022-06-13 NOTE — CONSULTS
[facility] and [address] [facility]  Consultation Report  PATIENT: Cam Goldberg RECORD NUMBER: [id number]  DATE OF SERVICE:  6/13/2022  ATTENDING ELECTROPHYSIOLOGIST: Trung Maldonado MD   PRIMARY ELECTROPHYSIOLOGIST: Trung Maldonado MD   REFERRING PHYSICIAN: Valdo BRIONES and Yokasta Berry DO  CHIEF COMPLAINT: Bradycardia. HPI: This is a 80 y.o. 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 He is known to Drs Oleksandr Jordan and Natali Mace and is maintained on Lisinopril 10mg daily and Toprol 50mg daily. He presented to the hospital with complaints of shortness of breath and weakness and noted Bradycardia  On a pulse ox with severe weakness at home. His wife called Dr. Natali Mace who advised he come to the hospital.  In the ER his device was interrogated and showed normal function however he was having Bigeminal PVCs with a single morphology. Patient Active Problem List   Diagnosis    Nonsustained ventricular tachycardia (HCC)    Near syncope    Mitral regurgitation    Ventricular tachycardia (paroxysmal) (HCC)    History of implantable cardioverter-defibrillator (ICD) placement    S/P ICD (internal cardiac defibrillator) procedure    Urethral pain    Pure hypercholesterolemia    Gross hematuria    BPH without obstruction/lower urinary tract symptoms    Weakness   who presents to the hospital complaining of Shortness of breath, fatigue and bradycardia. Cardiac electrophysiology service is consulted for Bradycardia with ICD in situ.     Patient Active Problem List    Diagnosis Date Noted    Weakness 06/12/2022     Priority: Medium    Pure hypercholesterolemia 08/18/2021  BPH without obstruction/lower urinary tract symptoms 08/18/2021    Urethral pain 07/25/2019    Gross hematuria 07/25/2019    History of implantable cardioverter-defibrillator (ICD) placement 04/02/2019    S/P ICD (internal cardiac defibrillator) procedure 04/02/2019    Nonsustained ventricular tachycardia ([facility] [address] 12/05/2018    Near syncope 12/05/2018    Mitral regurgitation 12/05/2018    Ventricular tachycardia (paroxysmal) ([facility] [address] 12/05/2018       Past Medical History:   Diagnosis Date    Arthritis     Cardiomyopathy ([facility] [address]     Hematuria syndrome     w/UTI--f/[facility]    LBBB (left bundle branch block)     Mitral valve disease     [facility]onsust[facility]cardia) [facility]     Pulmonary HTN ([facility]     Pure hypercholesterolemia 8/18/2021       Family History   Problem Relation Age of Onset    Coronary Art Dis Father     Pacemaker Father        Social History     Tobacco Use    Smoking status: Never Smoker    Smokeless tobacco: Never Used   Substance Use Topics    Alcohol use: No       Current Facility-Administered Medications   Medication Dose Route Frequency Provider Last Rate Last Admin    pantoprazole (PROTONIX) tablet 20 mg  20 mg Oral QAM AC Yifan Ireland, APRN - CNP   20 mg at 06/13/22 0946    [Held by provider] lisinopril (PRINIVIL;ZESTRIL) tablet 10 mg  10 mg Oral Daily Yifan Ireland, APRN - CNP        sodium chloride flush 0.9 % injection 5-40 mL  5-40 mL IntraVENous 2 times per day Yifan Ireland, APRN - CNP   10 mL at 06/13/22 0923    sodium chloride flush 0.9 % injection 5-40 mL  5-40 mL IntraVENous PRN Yifan Ireland, APRN - CNP        0.9 % sodium chloride infusion   IntraVENous PRN Yifan Ireland, APRN - CNP        ondansetron (ZOFRAN-ODT) disintegrating tablet 4 mg  4 mg Oral Q8H PRN Yifan Ireland, APRN - CNP        Or    ondansetron (ZOFRAN) injection 4 mg  4 mg IntraVENous Q6H PRN Yifan Ireland, APRN - CNP        polyethylene glycol [facility]) packet 17 g  17 g Oral Daily PRN Piyush Malady, APRN - CNP        acetaminophen (TYLENOL) tablet 650 mg  650 mg Oral Q6H PRN [place] Malady, APRN - CNP        Or    acetaminophen (TYLENOL) suppository 650 mg  650 mg Rectal Q6H PRN [place] Malady, APRN - CNP        perflutren lipid microspheres (DEFINITY) injection 1.65 mg  1.5 mL IntraVENous ONCE PRN Dorothye Southington, APRN - CNP            Allergies   Allergen Reactions    Iodine Swelling     Facial Swelling and Throat Closes Up    Shrimp (Diagnostic)     Shrimp Extract Allergy Skin Test Swelling       ROS:  Constitutional: Negative for fever, and appetite change. + for increased weakness and fatigue   HENT: Negative for epistaxis. Eyes: Negative for diploplia, blurred vision. Respiratory: Negative for cough, chest tightness,  and wheezing. + for shortness of breath. Cardiovascular: pertinent positives in HPI  Gastrointestinal: Negative for abdominal pain and blood in stool. All other review of systems are negative     PHYSICAL EXAM:   Vitals:    06/13/22 0600 06/13/22 0630 06/13/22 0746 06/13/22 0748   BP: 132/74 (!) 153/79 (!) 148/57    Pulse: 53 (!) 49 (!) 31 (!) 30   Resp: 16 14 16    Temp:  97.9 °F (36.6 °C) 97.9 °F (36.6 °C)    TempSrc:  Oral Oral    SpO2: 98% 99% 99%    Weight:       Height:          Constitutional: Well-developed, no acute distress  Eyes: conjunctivae normal, no xanthelasma   Ears, Nose, Throat: oral mucosa moist, no cyanosis   CV: no JVD. Regular rate and rhythm. Normal S1S2 and no S3. No murmurs, rubs, or gallops. PMI is nondisplaced  Lungs: clear to auscultation bilaterally, normal respiratory effort without used of accessory muscles  Abdomen: soft, non-tender, bowel sounds present, no masses or hepatomegaly   Musculoskeletal: no digital clubbing, no edema   Skin: warm, no rashes   Device site: well healed without signs of erosion.      I have personally reviewed the laboratory, cardiac diagnostic and radiographic testing as outlined below:    Data:    Recent Labs     06/12/22 2041   WBC 5.8   HGB 11.0*   HCT 34.2*        Recent Labs     06/12/22 2041      K 5.1*      CO2 23   BUN 41*   CREATININE 1.8*   CALCIUM 8.7      Lab Results   Component Value Date    MG 2.2 12/07/2018     No results for input(s): TSH in the last 72 hours. No results for input(s): INR in the last 72 hours. CXR 06/12/22:      FINDINGS:   Median sternotomy wires are present.  Left pacemaker present.  Mild   cardiomegaly.  No airspace opacity or pleural effusion.  No pneumothorax.           Impression   No evidence of pneumonia or pleural effusion. Telemetry: AP-VS with frequent PVCs. EKG 06/12/22: AP-VS with Bigeminal PVCs, LBBB  rate 64 bpm, OK 96ms, QRS  140ms. Please see scan in Cardiology. Echocardiogram 12/06/18: Findings      Left Ventricle   Left ventricular size is normal.   Mild left ventricular concentric hypertrophy noted. Abnormal (paradoxical) motion consistent with left bundle branch block. Ejection fraction is visually estimated at 45-50%. Global hypokinesis      Right Ventricle   Normal right ventricular size and function. Left Atrium   The left atrium is moderately dilated. Right Atrium   Mildly enlarged right atrium size. Mitral Valve   Moderate mitral annular calcification is present. Status - post mitral annular ring insertion. Moderate mitral stenosis. No evidence of mitral regurgitaton. Tricuspid Valve   Normal tricuspid valve structure and function. Mild to moderate tricuspid regurgitation. RVSP is 40 mmHg. Pulmonary hypertension is mild to moderate . Aortic Valve   The aortic valve appears moderately sclerotic. Individual aortic valve leaflets are not clearly visualized. Mild aortic regurgitation is noted. No hemodynamically significant aortic stenosis is present. Pulmonic Valve   The pulmonic valve was not well visualized. Pericardial Effusion   No evidence of pericardial effusion. Aorta   Aortic root dimension within normal limits. Conclusions      Summary   Left ventricular size is normal.   Mild left ventricular concentric hypertrophy noted. Abnormal (paradoxical) motion consistent with left bundle branch block. Ejection fraction is visually estimated at 45-50%. Global hypokinesis   The left atrium is moderately dilated. Moderate mitral annular calcification is present. Status - post mitral annular ring insertion. Moderate mitral stenosis. No evidence of mitral regurgitaton. The aortic valve appears moderately sclerotic. Individual aortic valve leaflets are not clearly visualized. Mild aortic regurgitation is noted. No hemodynamically significant aortic stenosis is present. Normal tricuspid valve structure and function. Mild to moderate tricuspid regurgitation. RVSP is 40 mmHg. Pulmonary hypertension is mild to moderate . No evidence of pericardial effusion.       Signature      ----------------------------------------------------------------   Electronically signed by Ricky Horan MD(Interpreting   physician) on 12/06/2018 06:50 PM   ----------------------------------------------------------------        Stress Test 05/01/19:     FINDINGS: The overall quality of the study was good.      Left ventricular cavity size was noted to be normal.     Rotational analog analysis demonstrated abnormal patient motion.       A moderate defect was present in the anteroseptal and the apical anterior wall(s) that was  small size on the post regadenoson images                                             There also was a moderate defect present in the inferior and basal inferolateral wall(s) that was  moderate size     The resting images are show no change.      Gated SPECT left ventricular ejection fraction was calculated to be 60% with normal myocardial thickening and dyskinesis of the apical septal wall    Impression:    1. Electrocardiographically non diagnostic regadenoson infusion because of the LBBB  2. Myocardial perfusion imaging was normal with attenuation artifact. 3. Overall left ventricular systolic function was normal with apical septal dyskinesis. 4. Low risk general pharmacologic stress test     Thank you for sending your patient to this [employer]. Device Interrogation ( 06/13/22 )  Make/Model Medtronic Evera Xt   Mode AAIR<--> DDDR 50/120  P wave: 2.5 mV  Impedance: 99 ohms   Threshold: 0.75 V @ 0.4 ms  RV R wave: 11.4 mV  Impedance: 342 ohms   Threshold: 1.0 V @ 0.4 ms  Pacing: A: 96.2%  RV: 0.7%   Battery Voltage/Longevity:  5.6 years     Arrhythmias: none   Reprogramming included --lower rate programmed to 60 bpm, MVP mode, sensor unchanged  Overall device function is normal  All device programmable settings were evaluated per above and in the scanned document, along with iterative adjustments (capture thresholds) to assess and select the most appropriate final programming to provide for consistent delivery of the appropriate therapy and to verify function of the device. I have independently reviewed all of the ECGs and rhythm strips per above     Assessment/Plan:     1. ICD in situ   - DOI 04/02/2019  - Medtronic, secondary prevention.    - + EP study for Polymorphic VF.   - Normal device function     2. Mitral valve repair   - [facility] at [facility]   - Moderate MS noted 12/06/2018    3. PVCs   - Single morphology. - Toprol 50mg daily. 4. LBBB     5. HFrEF   - Low risk stress without infarct or ischemia 05/01/19  - LVEF 45-50 12/06/2018  - Compensated  - GDMT: Lisinopril Toprol 50mg daily. 6.  BPH   - Prior TURP    7. HLD     8.  WILL on CKD   - Creat 1.35 at [facility] 03/09/21, sees Dr. Crawley Me who notes \"Possible insults include urinary obstruction from urethral stricture in July 2019, Daily NSAID use for many years with heavy use in 2021, stopped Nov 2021\"   - Creat 1.8 on admission now 1.4 post IV fluids. Recommendations:  1. He was seen by cardiology and will need a stress and echo during this admission. 2. LRL increased to 60. 3. Hold Lisinopril at this time will plan to reintroduce once WILL has improved at 5mg daily. 4. Continue Toprol 50 but make dose at HS. 5. EP will follow    I have spent a total of 10 minutes with the patient and the family reviewing the above stated recommendations. And a total of >50% of that time involved face-to-face time providing counseling and or coordination of care with the other providers. Thank you for allowing me to participate in your patient's care. Please call me if there are any questions or concerns. Discussed with Dr. Jas Martinez. ANALI Anthony - CNP  Cardiac Electrophysiology  [facility]) Physicians  [facility] [place] [place] [facility]  10:17 AM  6/13/2022    I personally and independently saw and examined patient and reviewed all done pertinent laboratory data, imaging studies, ECGs and rhythm strips. I have reviewed and agree with the APRN history and physical exam as documented in the above note. History and physical data as well as assessment and plan were modified by me. Impression  81-year-old patient well-known to me for the following issues    Mitral valve repair in 1995  Left bundle branch block on EKG  Frequent ventricular ectopy and nonsustained VT on monitoring  Recurrent near syncope  Sinus node dysfunction on initial clinical evaluation  Mild LV dysfunction on echocardiography  Electrophysiology testing thereafter leading to his dual-chamber pacer ICD implantation    Patient's wife called me yesterday since Mr. Maite Gage has been complaining of symptoms of progressive dyspnea on exertion as well as fatigue.   His family had noted that he was sleeping a lot, has very poor eating habits and on the day of this admission he was severely bradycardic as noted on measurement at home. I advised them to call EMS who confirmed bradycardia and he was brought to the emergency room. On assessment here, his bradycardia was felt to be secondary to ventricular bigeminy and ICD function was appropriate. However, serum creatinine levels had risen to 1.8 from his baseline of 1.3 earlier this year. He received IV fluids with marked improvement in his clinical symptoms, serum creatinine levels. Recommendation    Cardiac work-up as planned.   Patient will undergo echocardiography and stress testing during this hospitalization  ICD was reprogrammed with a lower pacing rate increased up to 60 bpm.  The patient did notice a subjective improvement on ambulation post reprogramming  Medication changes as outlined above  Further recommendations to follow

## 2022-06-13 NOTE — CONSULTS
Inpatient Cardiology Consultation      Reason for Consult:  SOB with significant cardiac history     Consulting Physician: Dr Lizet Tavares     Requesting Physician:  Adelfo Cruz, APRN - CNP    Date of Consultation: 6/13/2022    HISTORY OF PRESENT ILLNESS:   Patient is a 82-year-old male who is known to the practice through Dr. Lyndsey Esparza. He was last seen outpatient 8/18/2021 with no changes to cardiac medications. 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    HPI:  · Patient presented to ER 6/12/2022 for several month history of weakness and SOB. Prior to coming to the emergency room the patient was at a graduation party and near syncopal and having visual changes. It was found that his heart rate was in the 30s. VS at time of arrival 98 Fahrenheit, 16 respirations, 33 heart rate, 161/76, 99% on room air. · Labs: Potassium 5.1, BUN 41, creatinine 1.8 (baseline 1.0),HScTNT 34, albumin 4.4, WBC 5.8, H&H 11.0/34.2, platelet 904, UA negative. · CXR: No acute process. · Upon assessment today 6/13/2022 patient is sitting at edge of bed eating breakfast.  The patient is alert and oriented, on room air speaking full sentences, and is in no apparent distress. The patient reports that over the last month he has had MAYS especially with steps, intermittent dizziness, and intermittent vision changes with blurriness. The patient denies chest pain, orthopnea, PND, resting SOB, palpitations, nausea, diaphoresis. He reports he was supposed to have an echocardiogram after seeing Dr. Lyndsey Esparza in August, but was unable to schedule due to [place]. He reports yesterday when he was found to have a heart rate in the 30s he had intermittent dizziness with visual changes, but no CP.   Patient reports he has been compliant with his lisinopril and Toprol. VS today 97.9, 14 respirations, 49 pulse, 153/79, 99% room air. Please note: past medical records were reviewed per electronic medical record (EMR) - see detailed reports under Past Medical/ Surgical History. Past Medical History:    1. HFrEF:  · TTE (12/5/2018) indication MR:Left ventricular size is normal. Mild left ventricular concentric hypertrophy noted. Abnormal (paradoxical) motion consistent with left bundle branch block. Ejection fraction is visually estimated at 45-50%. Global hypokinesis The left atrium is moderately dilated. Moderate mitral annular calcification is present. Status - post mitral annular ring insertion. Moderate mitral stenosis. No evidence of mitral regurgitaton. The aortic valve appears moderately sclerotic. Individual aortic valve leaflets are not clearly visualized. Mild aortic regurgitation is noted. No hemodynamically significant aortic stenosis is present. Normal tricuspid valve structure and function. Mild to moderate tricuspid regurgitation. RVSP is 40 mmHg. Pulmonary hypertension is mild to moderate . No evidence of pericardial effusion. · Lexiscan (5/1/2019): Electrocardiographically nondiagnostic regadenoson Infusion because of LBBB. Myocardial perfusion imaging was normal with attenuation artifact. Overall left ventricular systolic function was normal with apical septal dyskinesis. Low risk General pharmacological stress test  2. VHD:  · S/p MVR at [facility] in 1995  3. Dual-chamber ICD (4/2/2019):  · ICD interrogation (6/9/2022): A paced 72%/V paced 0.4%. No episodes detected. · Holter monitor 48-hour (1/30/2019): The rhythm was sinus. OR and QRS are within normal limits. 10 runs of SVT. 1 run of V. tach. Bigeminy and trigeminy noted. · LBBB   · NSVT  · Inducible sustained polymorphic VT degenerating into V. fib on electrophysiologic testing  4.  Enlarged prostate--s/p TURP 7/2018 after which patient had problems with Denies headaches, nose bleeds,oral pain, abscess or lesion. Intermittent blurred vision. · Musculoskeletal: Denies falls, pain to BLE with ambulation and edema to BLE. · Neurological: Denies numbness and tingling. Intermittent dizziness without syncope. · Cardiovascular: Denies chest pain, palpitations, and feelings of heart racing. · Respiratory: Denies orthopnea and PND. MAYS x1 month. · Gastrointestinal: Denies heartburn, nausea/vomiting, diarrhea and constipation, black/bloody, and tarry stools. · Genitourinary: Denies dysuria and hematuria  · Hematologic: Denies excessive bruising or bleeding  · Lymphatic: Denies lumps and bumps to neck, axilla, breast, and groin  · Endocrine: Denies excessive thirst. Denies intolerance to hot and cold  · GYN: Postmenopausal state; Denies vaginal bleeding. · Psychiatric: Denies anxiety and depression. PHYSICAL EXAM:   BP (!) 153/79   Pulse (!) 49   Temp 97.9 °F (36.6 °C) (Oral)   Resp 14   Ht 5' 7\" (1.702 m)   Wt 143 lb (64.9 kg)   SpO2 99%   BMI 22.40 kg/m²   CONST:  Well developed, 81-year-old  male well nourished who appears stated age. Awake, alert, cooperative, no apparent distress  HEENT:   Head- Normocephalic, atraumatic   Eyes- Conjunctivae pink, anicteric  Throat- Oral mucosa pink and moist  Neck-  No stridor, trachea midline, no jugular venous distention. No adenopathy   CHEST: Chest symmetrical and non-tender to palpation. No accessory muscle use or intercostal retractions  RESPIRATORY: Lung sounds - clear throughout fields   CARDIOVASCULAR:     No carotid bruit  Heart Inspection- shows no noted pulsations  Heart Palpation- no heaves or thrills; PMI is non-displaced   Heart Ausculation- Regular rate and rhythm, 2/6 systolic murmur at the apex. No s3, s4 or rub   PV: Trace pitting edema bilateral lower extremities to mid calf. No varicosities. Pedal pulses palpable, no clubbing or cyanosis   ABDOMEN: Soft, non-tender to light palpation. Bowel sounds present. No palpable masses no organomegaly; no abdominal bruit  MS: Good muscle strength and tone. No atrophy or abnormal movements. : Deferred  SKIN: Warm and dry no statis dermatitis or ulcers   NEURO / PSYCH: Oriented to person, place and time. Speech clear and appropriate. Follows all commands. Pleasant affect     DATA:    ECG: Bigeminy, vent rate 64, LA interval 96, QRS duration 140, QTc 458. Tele strips: Bigeminy, 80 heart rate  Diagnostic:    Labs:   CBC:   Recent Labs     06/12/22 2041   WBC 5.8   HGB 11.0*   HCT 34.2*        BMP:   Recent Labs     06/12/22 2041      K 5.1*   CO2 23   BUN 41*   CREATININE 1.8*   LABGLOM 37   CALCIUM 8.7     LIVER PROFILE:  Recent Labs     06/12/22 2041   AST 26   ALT 20   LABALBU 4.4     Results for Savannah Garcia (MRN [id number]) as of 6/13/2022 10:41   Ref. Range 6/12/2022 20:41   Troponin, High Sensitivity Latest Ref Range: 0 - 11 ng/L 34 (H)     CXR:  Impression   No evidence of pneumonia or pleural effusion. Assessment:  1. Bradycardia--bigeminy rhythm--MAYS x1 month with recent intermittent dizziness and blurred vision--electrophysiology also following. 2. WILL--creatinine 1.8 with baseline of 1.0 4/2019.  3. VHD with MVR (1995)--echocardiogram pending  4. Dual-chamber ICD (4/2019)  5. 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 6. Hypercholesteremia, not currently on statin. 7. Right-sided sciatica--currently controlled. Plan:  1. Review echocardiogram.  2. Start home metoprolol. 3. BNP. 4. Lexiscan stress test tomorrow. 5. Rest per primary service and other consultants. 6. Discussed with Dr. An Linton, further recommendations to follow as per above.         Electronically signed by ANALI Naranjo CNP on 6/13/2022 at 7:13 AM

## 2022-06-13 NOTE — PROGRESS NOTES
[address] Admission arrived and assessed. VS obtained. Heart Monitor showing HR 60, however, HR 31 on VS machine and reassessed manually at 30. Covering Attending Dr. Cisse [place]et advised via [address] [place] [address]. A&OX4, asymptomatic and pleasant.     Joanna Wiggins RN

## 2022-06-14 ENCOUNTER — APPOINTMENT (OUTPATIENT)
Dept: NUCLEAR MEDICINE | Age: 82
End: 2022-06-14
Payer: MEDICARE

## 2022-06-14 ENCOUNTER — APPOINTMENT (OUTPATIENT)
Dept: NON INVASIVE DIAGNOSTICS | Age: 82
End: 2022-06-14
Payer: MEDICARE

## 2022-06-14 LAB
ANION GAP SERPL CALCULATED.3IONS-SCNC: 13 MMOL/L (ref 7–16)
BASOPHILS ABSOLUTE: 0.03 E9/L (ref 0–0.2)
BASOPHILS RELATIVE PERCENT: 0.6 % (ref 0–2)
BUN BLDV-MCNC: 28 MG/DL (ref 6–23)
CALCIUM SERPL-MCNC: 8.8 MG/DL (ref 8.6–10.2)
CHLORIDE BLD-SCNC: 106 MMOL/L (ref 98–107)
CO2: 19 MMOL/L (ref 22–29)
CREAT SERPL-MCNC: 1.3 MG/DL (ref 0.7–1.2)
EKG ATRIAL RATE: 64 BPM
EKG P-R INTERVAL: 96 MS
EKG Q-T INTERVAL: 444 MS
EKG QRS DURATION: 140 MS
EKG QTC CALCULATION (BAZETT): 458 MS
EKG R AXIS: 90 DEGREES
EKG T AXIS: -83 DEGREES
EKG VENTRICULAR RATE: 64 BPM
EOSINOPHILS ABSOLUTE: 0.31 E9/L (ref 0.05–0.5)
EOSINOPHILS RELATIVE PERCENT: 5.9 % (ref 0–6)
GFR AFRICAN AMERICAN: >60
GFR NON-AFRICAN AMERICAN: 53 ML/MIN/1.73
GLUCOSE BLD-MCNC: 82 MG/DL (ref 74–99)
HCT VFR BLD CALC: 36 % (ref 37–54)
HEMOGLOBIN: 11.7 G/DL (ref 12.5–16.5)
IMMATURE GRANULOCYTES #: 0.01 E9/L
IMMATURE GRANULOCYTES %: 0.2 % (ref 0–5)
LV EF: 63 %
LVEF MODALITY: NORMAL
LYMPHOCYTES ABSOLUTE: 2.05 E9/L (ref 1.5–4)
LYMPHOCYTES RELATIVE PERCENT: 39 % (ref 20–42)
MCH RBC QN AUTO: 30.5 PG (ref 26–35)
MCHC RBC AUTO-ENTMCNC: 32.5 % (ref 32–34.5)
MCV RBC AUTO: 94 FL (ref 80–99.9)
MONOCYTES ABSOLUTE: 0.51 E9/L (ref 0.1–0.95)
MONOCYTES RELATIVE PERCENT: 9.7 % (ref 2–12)
NEUTROPHILS ABSOLUTE: 2.35 E9/L (ref 1.8–7.3)
NEUTROPHILS RELATIVE PERCENT: 44.6 % (ref 43–80)
PDW BLD-RTO: 14 FL (ref 11.5–15)
PLATELET # BLD: 182 E9/L (ref 130–450)
PMV BLD AUTO: 11 FL (ref 7–12)
POTASSIUM REFLEX MAGNESIUM: 4.1 MMOL/L (ref 3.5–5)
RBC # BLD: 3.83 E12/L (ref 3.8–5.8)
SODIUM BLD-SCNC: 138 MMOL/L (ref 132–146)
WBC # BLD: 5.3 E9/L (ref 4.5–11.5)

## 2022-06-14 PROCEDURE — 93017 CV STRESS TEST TRACING ONLY: CPT

## 2022-06-14 PROCEDURE — 80048 BASIC METABOLIC PNL TOTAL CA: CPT

## 2022-06-14 PROCEDURE — 99214 OFFICE O/P EST MOD 30 MIN: CPT | Performed by: INTERNAL MEDICINE

## 2022-06-14 PROCEDURE — 6370000000 HC RX 637 (ALT 250 FOR IP)

## 2022-06-14 PROCEDURE — 2580000003 HC RX 258

## 2022-06-14 PROCEDURE — G0378 HOSPITAL OBSERVATION PER HR: HCPCS

## 2022-06-14 PROCEDURE — 6370000000 HC RX 637 (ALT 250 FOR IP): Performed by: INTERNAL MEDICINE

## 2022-06-14 PROCEDURE — 99213 OFFICE O/P EST LOW 20 MIN: CPT | Performed by: INTERNAL MEDICINE

## 2022-06-14 PROCEDURE — 85025 COMPLETE CBC W/AUTO DIFF WBC: CPT

## 2022-06-14 PROCEDURE — 78452 HT MUSCLE IMAGE SPECT MULT: CPT

## 2022-06-14 PROCEDURE — 6360000002 HC RX W HCPCS

## 2022-06-14 PROCEDURE — 3430000000 HC RX DIAGNOSTIC RADIOPHARMACEUTICAL: Performed by: RADIOLOGY

## 2022-06-14 PROCEDURE — 78452 HT MUSCLE IMAGE SPECT MULT: CPT | Performed by: INTERNAL MEDICINE

## 2022-06-14 PROCEDURE — A9500 TC99M SESTAMIBI: HCPCS | Performed by: RADIOLOGY

## 2022-06-14 PROCEDURE — 93016 CV STRESS TEST SUPVJ ONLY: CPT | Performed by: INTERNAL MEDICINE

## 2022-06-14 PROCEDURE — 6370000000 HC RX 637 (ALT 250 FOR IP): Performed by: NURSE PRACTITIONER

## 2022-06-14 PROCEDURE — 36415 COLL VENOUS BLD VENIPUNCTURE: CPT

## 2022-06-14 PROCEDURE — 93018 CV STRESS TEST I&R ONLY: CPT | Performed by: INTERNAL MEDICINE

## 2022-06-14 RX ORDER — DIPHENHYDRAMINE HCL 25 MG
25 TABLET ORAL NIGHTLY PRN
Status: DISCONTINUED | OUTPATIENT
Start: 2022-06-14 | End: 2022-06-15

## 2022-06-14 RX ADMIN — METOPROLOL SUCCINATE 50 MG: 50 TABLET, EXTENDED RELEASE ORAL at 21:26

## 2022-06-14 RX ADMIN — Medication 12 MILLICURIE: at 08:16

## 2022-06-14 RX ADMIN — Medication 38 MILLICURIE: at 09:53

## 2022-06-14 RX ADMIN — DIPHENHYDRAMINE HYDROCHLORIDE 25 MG: 25 TABLET ORAL at 22:14

## 2022-06-14 RX ADMIN — REGADENOSON 0.4 MG: 0.08 INJECTION, SOLUTION INTRAVENOUS at 09:47

## 2022-06-14 RX ADMIN — PANTOPRAZOLE SODIUM 20 MG: 20 TABLET, DELAYED RELEASE ORAL at 06:22

## 2022-06-14 RX ADMIN — SODIUM CHLORIDE, PRESERVATIVE FREE 10 ML: 5 INJECTION INTRAVENOUS at 21:33

## 2022-06-14 NOTE — PROGRESS NOTES
Electrophysiology progress note         Date:  6/14/2022  Patient: [address]  Admission:  6/12/2022  7:32 PM  Admit DX: Weakness [R53.1]  General weakness [R53.1]  Acute kidney injury ([address] [N17.9]  Age:  80 y. o., 1940     LOS: 0 days     Reason for evaluation:   Ventricular ectopy, near syncope      SUBJECTIVE:    The patient was seen and examined. Notes and labs reviewed. There were no complications over night. Patient's cardiac review of systems: negative. The patient is generally feeling gradually improving. OBJECTIVE:    Telemetry: Atrial paced rhythm, PVCs  /89   Pulse 60   Temp 97.8 °F (36.6 °C) (Oral)   Resp 16   Ht 5' 7\" (1.702 m)   Wt 143 lb (64.9 kg)   SpO2 97%   BMI 22.40 kg/m²     Intake/Output Summary (Last 24 hours) at 6/14/2022 1252  Last data filed at 6/13/2022 1407  Gross per 24 hour   Intake 360 ml   Output 200 ml   Net 160 ml       EXAM:   CONSTITUTIONAL:  awake, alert, cooperative, no apparent distress, and appears stated age. HEENT: Normal jugular venous pulsations,  Head is atraumatic, normocephalic. Eyes and oral mucosa are normal.  LUNGS: Good respiratory effort. No for increased work of breathing. On auscultation: clear to auscultation bilaterally  CARDIOVASCULAR: Laterally displaced PMI, regular rate and rhythm, normal S1 and S2, no S3, grade 3/6 systolic murmur at the apex  ABDOMEN: Soft, nontender, nondistended. SKIN: Warm and dry. EXTREMITIES: No lower extremity edema. Motor movement grossly intact. No cyanosis or clubbing.     Current Inpatient Medications:   pantoprazole  20 mg Oral QAM AC    [Held by provider] lisinopril  10 mg Oral Daily    sodium chloride flush  5-40 mL IntraVENous 2 times per day    metoprolol succinate  50 mg Oral Nightly    enoxaparin  40 mg SubCUTAneous Daily       IV Infusions (if any):   sodium chloride         Diagnostics:    EKG: Atrial paced rhythm, intraventricular conduction delay, ventricular bigeminy  ECHO: ordered. Stress Test: reviewed. Impression   1.  ECG during the infusion did not change. 2.  The myocardial perfusion imaging was normal with attenuation   artifact. 3.  Overall left ventricular systolic function was normal without   regional wall motion abnormalities. 4.  No transient ischemic dilatation. 5.  Low risk general pharmacologic stress test.       Thank you for sending your patient to this [address], [place], [facility] cardiology       Cardiac Angiography: not obtained. Labs:   CBC:   Recent Labs     06/13/22  1026 06/14/22  0651   WBC 4.4* 5.3   HGB 11.2* 11.7*   HCT 35.9* 36.0*    182     BMP:   Recent Labs     06/13/22  1931 06/14/22  0651    138   K 4.4 4.1   CO2 22 19*   BUN 33* 28*   CREATININE 1.4* 1.3*   LABGLOM 49 53   GLUCOSE 168* 82     BNP: No results for input(s): BNP in the last 72 hours. PT/INR: No results for input(s): PROTIME, INR in the last 72 hours. APTT:No results for input(s): APTT in the last 72 hours. CARDIAC ENZYMES:No results for input(s): CKTOTAL, CKMB, CKMBINDEX, TROPONINI in the last 72 hours. FASTING LIPID PANEL:No results found for: HDL, LDLDIRECT, LDLCALC, TRIG  LIVER PROFILE:  Recent Labs     06/12/22 2041   AST 26   ALT 20   LABALBU 4.4       ASSESSMENT:    Patient Active Problem List   Diagnosis    Nonsustained ventricular tachycardia ([address]    Near syncope      Mitral regurgitation--status post mitral valve repair in 1995.   Mitral regurgitation murmur noted on exam.  Echocardiography pending      History of implantable cardioverter-defibrillator (ICD) placement--after of positive electrophysiology testing for near syncope and nonsustained VT      BPH without obstruction/lower urinary tract symptoms      Acute kidney injury (HCC)--most likely secondary to poor oral intake   Bradycardia noted by family, spuriously secondary to frequent ventricular ectopy    Progressive weakness and fatigue secondary to intravascular volume depletion from poor oral intake. Patient improved subjectively after reprogramming his ICD to a lower rate of 60 bpm as well as IV fluids    Stress test earlier today. Results noted. Echocardiography to assess mitral valve function pending    PLAN:    Resume lisinopril at 5 mg daily next week  Continue metoprolol at bedtime  Follow-up in the office after discharge as scheduled      Please see orders. Discussed with patient and spouse and nursing.     Ricky Horan MD, [facility]

## 2022-06-14 NOTE — PLAN OF CARE
Problem: Discharge Planning  Goal: Discharge to home or other facility with appropriate resources  6/14/2022 0459 by Elana Orta RN  Outcome: Progressing  6/14/2022 0459 by Elana Orta RN  Outcome: Progressing  Flowsheets (Taken 6/13/2022 2215)  Discharge to home or other facility with appropriate resources: Identify barriers to discharge with patient and caregiver     Problem: Safety - Adult  Goal: Free from fall injury  Outcome: Progressing

## 2022-06-14 NOTE — PROCEDURES
[facility] Nuclear Stress Test:    Cardiologist: Dr. Walter Mckeon EKG: NSR, LBBB, abnormal EKG    Indications for study: Chest pain    1. No chest pain  2. No new arrhythmias  3. No EKG changes suggestive of stress induced ischemia  4. Nuclear images pending    Julien Calvin MD., [facility].    [facility] Cardiology

## 2022-06-14 NOTE — PROGRESS NOTES
INPATIENT CARDIOLOGY FOLLOW-UP    Name: Cam Tadeo    Age: 80 y.o. Date of Admission: 6/12/2022  7:32 PM    Date of Service: 6/14/2022    Chief Complaint: Follow-up for shortness of breath with significant cardiac history    Interim History:  No new overnight cardiac complaints. Currently with no complaints of CP, SOB, palpitations, dizziness, or lightheadedness. SR on telemetry. Review of Systems:   Cardiac: As per HPI  General: No fever, chills  Pulmonary: As per HPI  HEENT: No visual disturbances, difficult swallowing  GI: No nausea, vomiting  Endocrine: No thyroid disease or DM  Musculoskeletal: SNIDER x 4, no focal motor deficits  Skin: Intact, no rashes  Neuro/Psych: No headache or seizures    Problem List:  Patient Active Problem List   Diagnosis    Nonsustained ventricular tachycardia ([address]    Near syncope    Mitral regurgitation    Ventricular tachycardia (paroxysmal) (HCC)    History of implantable cardioverter-defibrillator (ICD) placement    S/P ICD (internal cardiac defibrillator) procedure    Urethral pain    Pure hypercholesterolemia    Gross hematuria    BPH without obstruction/lower urinary tract symptoms    Weakness    Acute kidney injury ([address]       Allergies:   Allergies   Allergen Reactions    Iodine Swelling     Facial Swelling and Throat Closes Up    Shrimp (Diagnostic)     Shrimp Extract Allergy Skin Test Swelling       Current Medications:  Current Facility-Administered Medications   Medication Dose Route Frequency Provider Last Rate Last Admin    technetium sestamibi (CARDIOLITE) injection 35 millicurie  35 millicurie IntraVENous ONCE PRN Harpreet Tejada II, MD        pantoprazole (PROTONIX) tablet 20 mg  20 mg Oral QAM AC ANALI Hastings CNP   20 mg at 06/14/22 0622    [Held by provider] lisinopril (PRINIVIL;ZESTRIL) tablet 10 mg  10 mg Oral Daily ANALI Hastings CNP        sodium chloride flush 0.9 % injection 5-40 mL  5-40 mL IntraVENous 2 times per day Bhakti [place], APRN - CNP   10 mL at 06/13/22 2028    sodium chloride flush 0.9 % injection 5-40 mL  5-40 mL IntraVENous PRN Bhakti [place], APRN - CNP        0.9 % sodium chloride infusion   IntraVENous PRN Bhakti [place], APRN - CNP        polyethylene glycol (GLYCOLAX) packet 17 g  17 g Oral Daily PRN Bhakti [place], APRN - CNP        acetaminophen (TYLENOL) tablet 650 mg  650 mg Oral Q6H PRN Bhakti [place], APRN - CNP        Or    acetaminophen (TYLENOL) suppository 650 mg  650 mg Rectal Q6H PRN Bhakti [place], APRN - CNP        perflutren lipid microspheres (DEFINITY) injection 1.65 mg  1.5 mL IntraVENous ONCE PRN Sharlyne Bussing, APRN - CNP        regadenoson (LEXISCAN) injection 0.4 mg  0.4 mg IntraVENous ONCE PRN Sharlyne Bussing, APRN - CNP        metoprolol succinate (TOPROL XL) extended release tablet 50 mg  50 mg Oral Nightly Burnard Ashutosh, APRN - CNP        enoxaparin (LOVENOX) injection 40 mg  40 mg SubCUTAneous Daily Jalen Lee DO          sodium chloride         Physical Exam:  /89   Pulse 60   Temp 97.8 °F (36.6 °C) (Oral)   Resp 16   Ht 5' 7\" (1.702 m)   Wt 143 lb (64.9 kg)   SpO2 97%   BMI 22.40 kg/m²   Wt Readings from Last 3 Encounters:   06/12/22 143 lb (64.9 kg)   08/18/21 143 lb 9.6 oz (65.1 kg)   12/04/19 148 lb (67.1 kg)     Appearance: Awake, alert, no acute respiratory distress  Skin: Intact, no rash  Head: Normocephalic, atraumatic  Eyes: EOMI, no conjunctival erythema  ENMT: No pharyngeal erythema, MMM, no rhinorrhea  Neck: Supple, no elevated JVP, no carotid bruits  Lungs: Clear to auscultation bilaterally. No wheezes, rales, or rhonchi.   Cardiac: Regular rate and rhythm, +S1S2, no murmurs apparent  Abdomen: Soft, nontender, +bowel sounds  Extremities: Moves all extremities x 4, no lower extremity edema  Neurologic: No focal motor deficits apparent, normal mood and affect  Peripheral Pulses: Intact posterior tibial pulses bilaterally    Intake/Output:    Intake/Output Summary (Last 24 hours) at 6/14/2022 0950  Last data filed at 6/13/2022 1407  Gross per 24 hour   Intake 360 ml   Output 200 ml   Net 160 ml     No intake/output data recorded. Laboratory Tests:  Recent Labs     06/13/22  1025 06/13/22  1931 06/14/22  0651    140 138   K 4.7 4.4 4.1   * 107 106   CO2 17* 22 19*   BUN 34* 33* 28*   CREATININE 1.4* 1.4* 1.3*   GLUCOSE 108* 168* 82   CALCIUM 8.6 8.4* 8.8     Lab Results   Component Value Date    MG 2.2 12/07/2018     Recent Labs     06/12/22 2041   ALKPHOS 46   ALT 20   AST 26   PROT 6.6   BILITOT 0.4   LABALBU 4.4     Recent Labs     06/12/22 2041 06/13/22  1026 06/14/22  0651   WBC 5.8 4.4* 5.3   RBC 3.59* 3.67* 3.83   HGB 11.0* 11.2* 11.7*   HCT 34.2* 35.9* 36.0*   MCV 95.3 97.8 94.0   MCH 30.6 30.5 30.5   MCHC 32.2 31.2* 32.5   RDW 14.2 14.1 14.0    158 182   MPV 11.0 11.3 11.0     No results found for: CKTOTAL, CKMB, CKMBINDEX, TROPONINI  No results found for: INR, PROTIME  Lab Results   Component Value Date    TSH 1.940 12/06/2018     No results found for: LABA1C  No results found for: EAG  No results found for: CHOL  No results found for: TRIG  No results found for: HDL  No results found for: LDLCALC, LDLCHOLESTEROL  No results found for: LABVLDL, VLDL  No results found for: CHOLHDLRATIO    Cardiac Tests:  Telemetry findings reviewed: Atrial paced rhythm with heart rate in the 60s with frequent premature ventricular complexes, no new tachy/bradyarrhythmias overnight     EKG: Atrial paced rhythm with prolonged PA interval, left bundle branch block, frequent PVCs in a bigeminy pattern, abnormal EKG.      Vitals and labs were reviewed: Blood pressure 134/89 heart rate 60 respiratory rate 18 sats 97 % on room air.     Labs-BUN/creatinine 41/1.8>> 34/1.4>>1.3, potassium 5.1>> 4.7, troponin, high-sensitivity 34 and 30 liver function normal, WC 4.4 H&H 11.2/35.9>>11.7/36, Pro-BNP 1895      ? TTE (12/5/2018) indication MR:Left ventricular size is normal. Mild left ventricular concentric hypertrophy noted. Abnormal (paradoxical) motion consistent with left bundle branch block. Ejection fraction is visually estimated at 45-50%. Global hypokinesis The left atrium is moderately dilated. Moderate mitral annular calcification is present. Status - post mitral annular ring insertion. Moderate mitral stenosis. No evidence of mitral regurgitaton. The aortic valve appears moderately sclerotic. Individual aortic valve leaflets are not clearly visualized. Mild aortic regurgitation is noted. No hemodynamically significant aortic stenosis is present. Normal tricuspid valve structure and function. Mild to moderate tricuspid regurgitation.  RVSP is 40 mmHg. Pulmonary hypertension is mild to moderate . No evidence of pericardial effusion. ? Darral Plume (5/1/2019): Electrocardiographically nondiagnostic regadenoson Infusion because of LBBB.  Myocardial perfusion imaging was normal with attenuation artifact.  Overall left ventricular systolic function was normal with apical septal dyskinesis.  Low risk General pharmacological stress test  ? Pacer interrogation on 6/13/2022: Device Interrogation ( 06/13/22 )  Make/Model Medtronic Evera Xt   Mode AAIR<--> DDDR 50/120  P wave: 2.5 mV  Impedance: 99 ohms   Threshold: 0.75 V @ 0.4 ms  RV R wave: 11.4 mV  Impedance: 342 ohms   Threshold: 1.0 V @ 0.4 ms  Pacing: A: 96.2%  RV: 0.7%   Battery Voltage/Longevity:  5.6 years     Arrhythmias: none   Reprogramming included none   Overall device function is normal  All device programmable settings were evaluated per above and in the scanned document, along with iterative adjustments (capture thresholds) to assess and select the most appropriate final programming to provide for consistent delivery of the appropriate therapy and to verify function of the device.            Assessment:  · Apparent bradycardia, no evidence of bradycardia.   Pulse oximeter is erroneously detecting only PVCs. Based on the cardiac monitor and pacer evaluation there is no evidence of bradycardia. · Exertional dyspnea rule out LV dysfunction or prosthetic valve dysfunction  · History of heart failure with midrange EF  · Dizziness mostly secondary dehydration  · VHD: History of mitral valve repair underwent 9095 at [facility] with evidence of moderate mitral stenosis based on echo done in 95 at [facility]. · WILL with dehydration, creatinine 1.8>> 1.4>>1.3 (baseline creat 1.0)  · Dual-chamber ICD in situ (Medtronic)-April 2019, no ICD shocks, pacer interrogation showed no VT, VF 96% atrial pacing. · History of nonsustained VT, left bundle branch block with positive EP study in April 2019  · Hyperlipidemia not on statin  · Mild anemia  · Chronic left bundle branch block        Plan:   · Echocardiogram to assess LV function and prosthetic valve function is pending   · Keep N.p.o. and  Lexiscan nuclear stress test to rule out ischemia. Patient can eat after midnight. · Patient was advised to drink plenty of fluids   · Restart once his creatinine is back to baseline. · Continue home medications including metoprolol  · EP consult input was noted and appreciated  · Further recommendations pending.         Patricia Gifford MD., [place].   [facility]) Cardiology

## 2022-06-14 NOTE — PROGRESS NOTES
Patient states he does not want the stress test, he will only stay if he is getting an echo today. He does not like the way his medications are being ordered, and doesn't agree with them. He only wants his heart doctor he sees to mess with his medication and orders.

## 2022-06-15 ENCOUNTER — TELEPHONE (OUTPATIENT)
Dept: CARDIOLOGY CLINIC | Age: 82
End: 2022-06-15

## 2022-06-15 VITALS
RESPIRATION RATE: 16 BRPM | BODY MASS INDEX: 22.44 KG/M2 | DIASTOLIC BLOOD PRESSURE: 92 MMHG | OXYGEN SATURATION: 98 % | WEIGHT: 143 LBS | TEMPERATURE: 97.7 F | HEART RATE: 64 BPM | SYSTOLIC BLOOD PRESSURE: 132 MMHG | HEIGHT: 67 IN

## 2022-06-15 LAB
ANION GAP SERPL CALCULATED.3IONS-SCNC: 10 MMOL/L (ref 7–16)
BASOPHILS ABSOLUTE: 0.03 E9/L (ref 0–0.2)
BASOPHILS RELATIVE PERCENT: 0.6 % (ref 0–2)
BUN BLDV-MCNC: 26 MG/DL (ref 6–23)
CALCIUM SERPL-MCNC: 8.8 MG/DL (ref 8.6–10.2)
CHLORIDE BLD-SCNC: 106 MMOL/L (ref 98–107)
CO2: 21 MMOL/L (ref 22–29)
CREAT SERPL-MCNC: 1.2 MG/DL (ref 0.7–1.2)
EOSINOPHILS ABSOLUTE: 0.31 E9/L (ref 0.05–0.5)
EOSINOPHILS RELATIVE PERCENT: 6.1 % (ref 0–6)
GFR AFRICAN AMERICAN: >60
GFR NON-AFRICAN AMERICAN: 58 ML/MIN/1.73
GLUCOSE BLD-MCNC: 84 MG/DL (ref 74–99)
HCT VFR BLD CALC: 34.5 % (ref 37–54)
HEMOGLOBIN: 11.1 G/DL (ref 12.5–16.5)
IMMATURE GRANULOCYTES #: 0 E9/L
IMMATURE GRANULOCYTES %: 0 % (ref 0–5)
LV EF: 58 %
LVEF MODALITY: NORMAL
LYMPHOCYTES ABSOLUTE: 2.4 E9/L (ref 1.5–4)
LYMPHOCYTES RELATIVE PERCENT: 47.4 % (ref 20–42)
MCH RBC QN AUTO: 30.2 PG (ref 26–35)
MCHC RBC AUTO-ENTMCNC: 32.2 % (ref 32–34.5)
MCV RBC AUTO: 94 FL (ref 80–99.9)
MONOCYTES ABSOLUTE: 0.46 E9/L (ref 0.1–0.95)
MONOCYTES RELATIVE PERCENT: 9.1 % (ref 2–12)
NEUTROPHILS ABSOLUTE: 1.86 E9/L (ref 1.8–7.3)
NEUTROPHILS RELATIVE PERCENT: 36.8 % (ref 43–80)
PDW BLD-RTO: 13.9 FL (ref 11.5–15)
PLATELET # BLD: 185 E9/L (ref 130–450)
PMV BLD AUTO: 11.1 FL (ref 7–12)
POTASSIUM REFLEX MAGNESIUM: 4.2 MMOL/L (ref 3.5–5)
RBC # BLD: 3.67 E12/L (ref 3.8–5.8)
SODIUM BLD-SCNC: 137 MMOL/L (ref 132–146)
WBC # BLD: 5.1 E9/L (ref 4.5–11.5)

## 2022-06-15 PROCEDURE — 6370000000 HC RX 637 (ALT 250 FOR IP)

## 2022-06-15 PROCEDURE — 36415 COLL VENOUS BLD VENIPUNCTURE: CPT

## 2022-06-15 PROCEDURE — G0378 HOSPITAL OBSERVATION PER HR: HCPCS

## 2022-06-15 PROCEDURE — 96372 THER/PROPH/DIAG INJ SC/IM: CPT

## 2022-06-15 PROCEDURE — 6360000002 HC RX W HCPCS: Performed by: INTERNAL MEDICINE

## 2022-06-15 PROCEDURE — 2580000003 HC RX 258

## 2022-06-15 PROCEDURE — 85025 COMPLETE CBC W/AUTO DIFF WBC: CPT

## 2022-06-15 PROCEDURE — 80048 BASIC METABOLIC PNL TOTAL CA: CPT

## 2022-06-15 PROCEDURE — 93306 TTE W/DOPPLER COMPLETE: CPT

## 2022-06-15 RX ORDER — PANTOPRAZOLE SODIUM 20 MG/1
20 TABLET, DELAYED RELEASE ORAL
Qty: 30 TABLET | Refills: 0 | Status: SHIPPED | OUTPATIENT
Start: 2022-06-16

## 2022-06-15 RX ORDER — ACETAMINOPHEN 500 MG
1000 TABLET ORAL EVERY 6 HOURS PRN
Status: DISCONTINUED | OUTPATIENT
Start: 2022-06-15 | End: 2022-06-15 | Stop reason: HOSPADM

## 2022-06-15 RX ORDER — LISINOPRIL 10 MG/1
10 TABLET ORAL DAILY
Qty: 30 TABLET | Refills: 3
Start: 2022-06-20 | End: 2022-08-31 | Stop reason: ALTCHOICE

## 2022-06-15 RX ORDER — MECOBALAMIN 5000 MCG
5 TABLET,DISINTEGRATING ORAL NIGHTLY PRN
Status: DISCONTINUED | OUTPATIENT
Start: 2022-06-15 | End: 2022-06-15 | Stop reason: HOSPADM

## 2022-06-15 RX ORDER — MECOBALAMIN 5000 MCG
5 TABLET,DISINTEGRATING ORAL NIGHTLY
Status: DISCONTINUED | OUTPATIENT
Start: 2022-06-15 | End: 2022-06-15 | Stop reason: HOSPADM

## 2022-06-15 RX ORDER — LISINOPRIL 10 MG/1
10 TABLET ORAL DAILY
Qty: 30 TABLET | Refills: 3
Start: 2022-06-20 | End: 2022-06-15

## 2022-06-15 RX ORDER — BLOOD PRESSURE TEST KIT
1 KIT MISCELLANEOUS DAILY
Qty: 1 KIT | Refills: 0 | Status: SHIPPED | OUTPATIENT
Start: 2022-06-15

## 2022-06-15 RX ADMIN — ENOXAPARIN SODIUM 40 MG: 100 INJECTION SUBCUTANEOUS at 08:38

## 2022-06-15 RX ADMIN — SODIUM CHLORIDE, PRESERVATIVE FREE 10 ML: 5 INJECTION INTRAVENOUS at 08:38

## 2022-06-15 RX ADMIN — PANTOPRAZOLE SODIUM 20 MG: 20 TABLET, DELAYED RELEASE ORAL at 05:48

## 2022-06-15 NOTE — DISCHARGE SUMMARY
Patient: Cam Bangura Sex: male DOA: 6/12/2022   YOB: 1940 Age: 80 y.o. LOS:  LOS: 0 days    Admit Date: 6/12/2022   Discharge Date: 06/15/22   Primary Care Physician: Nicholas Ta DO   Discharge to: home with support  Readmission risk: Fab Bass 43 admission:  Per HPI:\" Mr. Patria Michel, a 80y.o. year old male  who  has a past medical history of Arthritis, Cardiomyopathy ([address] Hematuria syndrome, LBBB (left bundle branch block), Mitral valve disease, NSVT (nonsustained ventricular tachycardia) ([address] Pulmonary HTN ([address] and Pure hypercholesterolemia. Patient presents to ER secondary for weakness and fatigue complaint is moderate in severity worsened by activity and relieved by rest.  Patient also admits to visual changes, \"when things became hazy\" and reports that he almost felt like he was going to pass out, over the last few days. Allegedly, per family while they were at a graduation party,  patient become progressively more weak and his heart rate was in the 30s. Upon returning home, patient's wife checked patient's heart rate with a pulse ox and pulse showed to be in 30s. Per patient, patient's wife then called Dr. Ramya Taylor who immediately called back and asked them to send her a rhythm strip. Patient was then instructed to call EMS and report to the emergency department. Patient was admitted to observation with a consult for EP for further monitoring and evaluation.     ER COURSE:  Patient's ER course significant for a BMP with an elevated potassium of 5.1, creatinine of 1.8 and elevated troponin at 34. CBC notable for a slightly low hemoglobin of 11 and hematocrit of 34.2. Chest x-ray shows no evidence of pneumonia or pleural effusions. EKG showed paced rhythm with PACs and PVCs with a rate in the 50s. ER physician reached out to EP that sees patient.  [facility] Course and discharge assessment with plan:     Generalized weakness 2/2 WILL with hyperkalemia, altered mental status, fever, chills, nausea, vomiting, abdominal pain or any other concerns. Activity: progressive as tolerated. Diet: cardiac diet     Wound Care: none needed     Consults: cardiology and EP       Discharge Physical Exam:   BP (!) 132/92   Pulse 64   Temp 97.7 °F (36.5 °C) (Oral)   Resp 16   Ht 5' 7\" (1.702 m)   Wt 143 lb (64.9 kg)   SpO2 98%   BMI 22.40 kg/m²     General appearance: Alert, cooperative, no distress, appears stated age   Head: Normocephalic, without obvious abnormality, atraumatic   Neck: Supple, no lymphadenopathy or thyromegaly, trachea midline   Lungs: Clear to auscultation bilaterally, no wheezing or crackles   Heart: Regular rate and rhythm, S1, S2 normal   Abdomen: Soft, non-tender and not-distended.  Bowel sounds normal.   Extremities: Extremities normal, atraumatic, no cyanosis or edema   Skin: Skin color, texture, turgor normal. No rashes or lesions   Neurologic: AAOx3 CN I-XII intact, normal muscle tone and power, normal sensation       Jeanie Uribe MD   06/15/22 1:14 PM    Hospital Medicine   Time Spent: 55 min

## 2022-06-15 NOTE — PROGRESS NOTES
Discharge instructions provided and reviewed with the pt and wife at bedside. All questions answered at this time time. Pt verbalizes an understanding of importance of medication compliance and scheduling/attending all follow up appointments. Pt left with all belongings at this time.

## 2022-06-15 NOTE — TELEPHONE ENCOUNTER
Patient is currently IP - he had his echocardiogram performed today - was scheduled in office for this Friday  - just for your review

## 2022-06-15 NOTE — PROGRESS NOTES
CLINICAL PHARMACY NOTE: MEDS TO BEDS    Total # of Prescriptions Filled: 1   The following medications were delivered to the patient:  · Protonix 40    Additional Documentation:   To PT in room

## 2022-06-15 NOTE — PROGRESS NOTES
Hospitalist Progress Note      Synopsis: Patient admitted on 6/12/2022 for bradycardia and lightheadedness. Subjective    Clinically improving. Feeling better. Stable overnight. No other overnight issues reported. No CP, SOB, palpitations, blurred vision, HA, lightheadedness, LOC or focal neurological deficits. No further lightheadedness. Exam:  /82   Pulse 66   Temp 97.5 °F (36.4 °C) (Oral)   Resp 18   Ht 5' 7\" (1.702 m)   Wt 143 lb (64.9 kg)   SpO2 100%   BMI 22.40 kg/m²   General appearance: No apparent distress, appears stated age and cooperative. HEENT: Pupils equal, round, and reactive to light. Conjunctivae/corneas clear. Neck: Supple. No jugular venous distention. Trachea midline. Respiratory:  Normal respiratory effort. Clear to auscultation, bilaterally without Rales/Wheezes/Rhonchi. Cardiovascular: Regular rate and rhythm with normal S1/S2 without murmurs, rubs or gallops. Abdomen: Soft, non-tender, non-distended with normal bowel sounds. Musculoskeletal: No clubbing, cyanosis or edema bilaterally. Brisk capillary refill. 2+ lower extremity pulses (dorsalis pedis).    Skin:  No rashes    Neurologic: awake, alert and following commands     Medications:  Reviewed    Infusion Medications    sodium chloride       Scheduled Medications    pantoprazole  20 mg Oral QAM AC    [Held by provider] lisinopril  10 mg Oral Daily    sodium chloride flush  5-40 mL IntraVENous 2 times per day    metoprolol succinate  50 mg Oral Nightly    enoxaparin  40 mg SubCUTAneous Daily     PRN Meds: sodium chloride flush, sodium chloride, polyethylene glycol, acetaminophen **OR** acetaminophen, perflutren lipid microspheres    I/O    Intake/Output Summary (Last 24 hours) at 6/14/2022 2016  Last data filed at 6/14/2022 1449  Gross per 24 hour   Intake 360 ml   Output --   Net 360 ml       Labs:   Recent Labs     06/12/22  2041 06/13/22  1026 06/14/22  0651   WBC 5.8 4.4* 5.3   HGB 11.0* 11.2* 11.7*   HCT 34.2* 35.9* 36.0*    158 182       Recent Labs     06/13/22  1025 06/13/22  1931 06/14/22  0651    140 138   K 4.7 4.4 4.1   * 107 106   CO2 17* 22 19*   BUN 34* 33* 28*   CREATININE 1.4* 1.4* 1.3*   CALCIUM 8.6 8.4* 8.8       Recent Labs     06/12/22  2041   PROT 6.6   ALKPHOS 46   ALT 20   AST 26   BILITOT 0.4       No results for input(s): INR in the last 72 hours. No results for input(s): Erendira Wanda in the last 72 hours. Chronic labs:  Lab Results   Component Value Date    TSH 1.940 12/06/2018       Radiology:  Imaging studies reviewed today. ASSESSMENT:    Principal Problem:    Weakness  Active Problems:    Acute kidney injury ([address]  Resolved Problems:    * No resolved hospital problems. *    1. Generalized weakness/lightheadedness  -Sounds orthostatic in nature.  -Patient received IV fluid bolus in the emergency department. Feels slightly better.  -Currently, denies any dizziness or generalized weakness. 2.  Dyspnea on exertion/bradycardia/elevated troponin  Patient evaluated by electrophysiology as well as general cardiology. Stress test is low risk. Echocardiogram still pending. We will continue to monitor. Chest x-ray shows mild cardiomegaly but no airspace opacity or pleural effusions. No pneumothorax. Continue to monitor closely. 3.  Acute renal failure  Patient's baseline appears to be 1.35 according to documentation from electrophysiology. Creatinine was 1.8 on admission. Hold lisinopril. Patient was given fluid bolus and creatinine has improved to 1.3. Avoid nephrotoxic medications and continue to monitor closely. Physiology would like to restart lisinopril at a lower dose next week. 4.  Nonsustained V. Tach  Electrophysiology consulted. Patient did have episode of polymorphic V. fib an electrophysiology study. Continue metoprolol. Continue to monitor on telemetry. No further episodes of V. tach.     5.  Valvular heart disease, status post mitral valve repair in 1995  Repeat echocardiogram ordered and is pending. 6.  BPH  Will get postvoid residuals. 7.  Hyperkalemia  Resolved. Continue to monitor. PLAN:  Patient will likely be discharged home after echocardiogram tomorrow. Diet: ADULT DIET; Regular; No Added Salt (3-4 gm)  Code Status: Full Code  DVT Prophylaxis:    Lovenox  Recommended disposition at discharge:   Discharge delayed secondary to echocardiogram not being completed.    +++++++++++++++++++++++++++++++++++++++++++++++++  Cindy Crystal, [address]  +++++++++++++++++++++++++++++++++++++++++++++++++  NOTE: This report was transcribed using voice recognition software. Every effort was made to ensure accuracy; however, inadvertent computerized transcription errors may be present.

## 2022-06-15 NOTE — CARE COORDINATION
Care coordination: per rounding with attending, no need to wait for echo completion, plan for pt to follow up with Dr Jurado Prom office as already scheduled for Friday.  Plan is home with wife, no needs    Debbie Apodaca

## 2022-08-18 ENCOUNTER — NURSE ONLY (OUTPATIENT)
Dept: NON INVASIVE DIAGNOSTICS | Age: 82
End: 2022-08-18
Payer: MEDICARE

## 2022-08-18 DIAGNOSIS — Z95.810 PRESENCE OF AUTOMATIC CARDIOVERTER/DEFIBRILLATOR (AICD): Primary | ICD-10-CM

## 2022-08-18 PROCEDURE — 93290 INTERROG DEV EVAL ICPMS IP: CPT | Performed by: INTERNAL MEDICINE

## 2022-08-18 PROCEDURE — 93283 PRGRMG EVAL IMPLANTABLE DFB: CPT | Performed by: INTERNAL MEDICINE

## 2022-08-31 ENCOUNTER — OFFICE VISIT (OUTPATIENT)
Dept: CARDIOLOGY CLINIC | Age: 82
End: 2022-08-31
Payer: MEDICARE

## 2022-08-31 VITALS
OXYGEN SATURATION: 98 % | SYSTOLIC BLOOD PRESSURE: 138 MMHG | DIASTOLIC BLOOD PRESSURE: 84 MMHG | WEIGHT: 143.4 LBS | HEART RATE: 53 BPM | HEIGHT: 71 IN | BODY MASS INDEX: 20.08 KG/M2 | RESPIRATION RATE: 16 BRPM

## 2022-08-31 DIAGNOSIS — I48.0 PAF (PAROXYSMAL ATRIAL FIBRILLATION) (HCC): ICD-10-CM

## 2022-08-31 DIAGNOSIS — Z95.810 S/P ICD (INTERNAL CARDIAC DEFIBRILLATOR) PROCEDURE: ICD-10-CM

## 2022-08-31 DIAGNOSIS — Z90.79 H/O TRANSURETHRAL RESECTION OF PROSTATE: ICD-10-CM

## 2022-08-31 DIAGNOSIS — Z86.19 HISTORY OF SHINGLES: ICD-10-CM

## 2022-08-31 DIAGNOSIS — Z86.79 H/O CARDIOMYOPATHY: ICD-10-CM

## 2022-08-31 DIAGNOSIS — I47.29 NONSUSTAINED VENTRICULAR TACHYCARDIA: ICD-10-CM

## 2022-08-31 DIAGNOSIS — M54.41 CHRONIC RIGHT-SIDED LOW BACK PAIN WITH RIGHT-SIDED SCIATICA: ICD-10-CM

## 2022-08-31 DIAGNOSIS — Z79.01 ANTICOAGULATED BY ANTICOAGULATION TREATMENT: ICD-10-CM

## 2022-08-31 DIAGNOSIS — G89.29 CHRONIC RIGHT-SIDED LOW BACK PAIN WITH RIGHT-SIDED SCIATICA: ICD-10-CM

## 2022-08-31 DIAGNOSIS — N32.3 BLADDER DIVERTICULUM: ICD-10-CM

## 2022-08-31 DIAGNOSIS — Z98.890 H/O TRANSURETHRAL RESECTION OF PROSTATE: ICD-10-CM

## 2022-08-31 DIAGNOSIS — Z98.890 HISTORY OF MITRAL VALVE REPAIR: ICD-10-CM

## 2022-08-31 DIAGNOSIS — N18.31 STAGE 3A CHRONIC KIDNEY DISEASE (HCC): ICD-10-CM

## 2022-08-31 DIAGNOSIS — I44.7 LBBB (LEFT BUNDLE BRANCH BLOCK): ICD-10-CM

## 2022-08-31 DIAGNOSIS — I44.0 FIRST DEGREE ATRIOVENTRICULAR BLOCK: ICD-10-CM

## 2022-08-31 DIAGNOSIS — I38 VHD (VALVULAR HEART DISEASE): Primary | ICD-10-CM

## 2022-08-31 PROCEDURE — 1123F ACP DISCUSS/DSCN MKR DOCD: CPT | Performed by: INTERNAL MEDICINE

## 2022-08-31 PROCEDURE — 99214 OFFICE O/P EST MOD 30 MIN: CPT | Performed by: INTERNAL MEDICINE

## 2022-08-31 PROCEDURE — 93000 ELECTROCARDIOGRAM COMPLETE: CPT | Performed by: INTERNAL MEDICINE

## 2022-08-31 NOTE — PROGRESS NOTES
OFFICE VISIT        PRIMARY CARE PHYSICIAN:    Geovanna Lynn DO     ALLERGIES / SENSITIVITIES:    Allergies   Allergen Reactions    Iodine Swelling     Facial Swelling and Throat Closes Up    Shrimp (Diagnostic)     Shrimp Extract Allergy Skin Test Swelling      REVIEWED MEDICATIONS:      Current Outpatient Medications:     apixaban (ELIQUIS) 5 MG TABS tablet, Take 1 tablet by mouth 2 times daily, Disp: 60 tablet, Rfl: 5    pantoprazole (PROTONIX) 20 MG tablet, Take 1 tablet by mouth every morning (before breakfast) (Patient taking differently: Take 20 mg by mouth daily as needed), Disp: 30 tablet, Rfl: 0    metoprolol succinate (TOPROL XL) 50 MG extended release tablet, Take 1 tablet by mouth daily, Disp: 30 tablet, Rfl: 3    Blood Pressure KIT, 1 kit by Does not apply route daily, Disp: 1 kit, Rfl: 0    S: REASON FOR VISIT:    Valvular heart disease and history of cardiomyopathy, status post mitral valve repair in 1995. History of nonsustained ventricular tachycardia and inducible sustained polymorphic ventricular tachycardia degenerating into ventricular fibrillation on electrophysiologic testing, status post dual chamber ICD implantation in 04/2019. Paroxysmal atrial fibrillation, on anticoagulation with EliquisRajat Levy is a pleasant 68-year-old male who was seen by me in the office a year ago in 08/2021. He was hospitalized from 06/12/2022-06/15/2022 with acute renal failure secondary to volume depletion. He was taken off lisinopril with the plan to restart lisinopril at a lower dose. His ICD was interrogated and the rate was increased. He apparently on follow up ICD interrogation was noted to have atrial fibrillation. His ICD rate was decreased again and he was started on Eliquis. He returns today for follow up. He denies chest pain. He denies shortness of breath. He denies orthopnea, PNDs or lower extremity swelling.   He denies palpitations, dizziness, presyncope, syncope or discharges from his ICD.  He has not started taking Eliquis as per the recommendation of his nephrologist and PCP. He has been drinking more water and also Gatorade. REVIEW OF SYSTEMS:    CONSTITUTIONAL:  Denies fevers, chills, night sweats or fatigue. HEENT:  Denies any unusual headaches. Denies recent changes in hearing or vision. Denies dysphagia, hoarseness, hemoptysis, hematemesis or epistaxis. ENDOCRINE:  Denies polyphagia, polydipsia or polyuria. Denies heat or cold intolerance. MUSCULOSKELETAL:  He has right-sided low back pain with right-sided lower extremity sciatica. SKIN:  Denies rashes, ulcers or itching. HEME/LYMPH:  Denies any palpable lymph nodes, bleeding or easy bruisability. HEART:  As above. LUNGS:  Denies cough or sputum production. GI: Denies abdominal pain, nausea, vomiting, diarrhea, constipation, rectal bleeding or tarry stools. :  Denies hematuria or dysuria. PSYCHIATRIC:  Denies mood changes, anxiety or depression. NEUROLOGIC:  Denies memory loss, motor weakness, numbness, tingling or tremors. PAST MEDICAL/SURGICAL HISTORY:    1. History of prostate enlargement, S/P TURP 7/9/2018, after which the patient had problems with recurrent urinary retention, urinary infection and hematuria and underwent cystoscopy on 10/12/2018 and again in 01/2019 at [facility], at which time scar tissue was removed. He also has bladder diverticulum. 2.  S/P mitral valve repair surgery at [facility] in [address] [place] [address].  3.  Cardiomyopathy. 4.  Left bundle branch block. 5.  Echocardiogram done on 12/6/2018, was reported as showing normal left ventricular size with mild concentric left ventricular hypertrophy with paradoxical septal motion consistent with left bundle branch block with an estimated ejection fraction of 45-50%. Normal right ventricular size and function, moderately dilated left atrium and mildly dilated right atrium.   Moderate mitral annular calcification, S/P mitral annular ring insertion with moderate mitral stenosis with no evidence of mitral regurgitation. Mild-to-moderate tricuspid regurgitation. Mild pulmonary hypertension with an RVSP of 40 mmHg. Moderately sclerotic aortic valve with mild aortic regurgitation. 6.  Nonsustained ventricular tachycardia. a. Inducible sustained polymorphic VT degenerating into ventricular fibrillation on electrophysiologic testing.    b.  Insertion of dual chamber pacemaker ICD, 4/2/2019.    7.  [employer] nuclear stress test done on 5/1/2019 showed normal myocardial perfusion images with attenuation artifacts with no convincing evidence of scar or stress-induced ischemia with the gated views showing apical septal dyskinesis, with a calculated ejection fraction of 60%. 8.  Right-sided lower back pain with right lower extremity sciatica. 9.  Right flank/mid back area shingles 07/2021, treated with antiviral medications and gabapentin. 10.  Chronic kidney disease. 11.   Hospitalized in 06/2022 with volume depletion secondary to poor PO intake and acute kidney injury on top of chronic kidney disease. 15.   Lexiscan nuclear stress test done on 06/14/2022 was normal with attenuation artifact with the gated views showing abnormal septal motion secondary to open heart surgery and LBBB with a calculated EF of 63% with no transient ischemic dilatation:  Low risk general pharmacologic stress test.    13.   Echocardiogram done on 06/15/2022 was read as showing normal left ventricular size and systolic function with an estimated EF of 55-60% with septal motion consistent with post bypass surgery with moderate concentric LVH with indeterminate diastolic function. Normal right ventricular systolic function. Mildly dilated right ventricle with normal right ventricular global systolic function. Severely dilated left atrium. Mild aortic regurgitation. Moderate tricuspid regurgitation. Pulmonary hypertension with RVSP 49 mmHg.   Mitral annular ring with no evidence of mitral stenosis with trace mitral regurgitation. 14.   Paroxysmal atrial fibrillation diagnosed in 2022, started on Eliquis. FAMILY HISTORY:  Mother living at age 80 with no significant health issues. Father  post hip surgery at age 80: Thought to be heart related. SOCIAL HISTORY:  Patient does not smoke. He drinks an occasional glass of wine. He denies any illicit drug use. O:  COMPLETE PHYSICAL EXAM:      /84 (Site: Right Upper Arm, Position: Sitting, Cuff Size: Medium Adult)   Pulse 53   Resp 16   Ht 5' 11\" (1.803 m)   Wt 143 lb 6.4 oz (65 kg)   SpO2 98%   BMI 20.00 kg/m²      General:          Well-developed, well-nourished male in no distress. HEENT:           Normocephalic and atraumatic head. No JVD. No carotid bruits. Carotid upstrokes normal bilaterally. No thyromegaly. Sclerae not icteric. No xanthelasmas. Mucous membranes moist.  Chest:              Symmetrical and nontender. Old sternotomy scar well-healed. ICD scar well-healed. Lungs:             Clear to auscultation bilaterally. Heart:              Normal S1 and S2. No S3 or S4. Grade 2/6 systolic murmur heard at the apex/axilla. Abdomen:        Soft, nontender without organomegaly or masses. No bruits. Normal bowel sounds. Extremities:     No edema. Skin:                Normal turgor. No rashes or ulcers noted. Neurologic:      Oriented x3. No motor or sensory deficits detected. REVIEW OF DIAGNOSTIC TESTS:    -Records, imaging studies and blood tests from the hospitalization in  reviewed. -Echocardiogram and stress test from the hospitalization in  reviewed:  As under Past Medical/Surgical History.  -Blood tests from 2022 reviewed:  BUN 28, creatinine 1.38, potassium 4.6, sodium 140, GFR 52.  -EKG done today showed sinus bradycardia with first-degree AV block and with LBBB.                   ASSESSMENT / DIAGNOSIS:   Valvular

## 2022-09-14 ENCOUNTER — NURSE ONLY (OUTPATIENT)
Dept: CARDIOLOGY CLINIC | Age: 82
End: 2022-09-14

## 2022-09-14 ENCOUNTER — HOSPITAL ENCOUNTER (OUTPATIENT)
Age: 82
Discharge: HOME | End: 2022-09-14
Payer: MEDICARE

## 2022-09-14 VITALS
HEIGHT: 71 IN | DIASTOLIC BLOOD PRESSURE: 70 MMHG | WEIGHT: 143 LBS | OXYGEN SATURATION: 98 % | SYSTOLIC BLOOD PRESSURE: 144 MMHG | BODY MASS INDEX: 20.02 KG/M2 | RESPIRATION RATE: 16 BRPM

## 2022-09-14 DIAGNOSIS — N18.31 STAGE 3A CHRONIC KIDNEY DISEASE (HCC): ICD-10-CM

## 2022-09-14 LAB
ANION GAP SERPL CALCULATED.3IONS-SCNC: 16 MMOL/L (ref 7–16)
BUN BLDV-MCNC: 27 MG/DL (ref 6–23)
CALCIUM SERPL-MCNC: 9.4 MG/DL (ref 8.6–10.2)
CHLORIDE BLD-SCNC: 105 MMOL/L (ref 98–107)
CO2: 21 MMOL/L (ref 22–29)
CREAT SERPL-MCNC: 1.4 MG/DL (ref 0.7–1.2)
GFR AFRICAN AMERICAN: 59
GFR NON-AFRICAN AMERICAN: 49 ML/MIN/1.73
GLUCOSE BLD-MCNC: 93 MG/DL (ref 74–99)
POTASSIUM SERPL-SCNC: 4.7 MMOL/L (ref 3.5–5)
SODIUM BLD-SCNC: 142 MMOL/L (ref 132–146)

## 2022-09-14 PROCEDURE — 80048 BASIC METABOLIC PNL TOTAL CA: CPT

## 2022-09-14 PROCEDURE — 36415 COLL VENOUS BLD VENIPUNCTURE: CPT

## 2022-09-14 RX ORDER — LISINOPRIL 5 MG/1
5 TABLET ORAL EVERY MORNING
COMMUNITY

## 2022-09-14 NOTE — PROGRESS NOTES
Patient was seen in the office today for a blood pressure check per ..     Standing BP:  Standing P:  Sitting BP:  Sitting P:

## 2022-09-14 NOTE — PROGRESS NOTES
Here today for a 2 week BP/HR check per Dr Jessica Kaba. On 8/31/22 /84 & HR 53. Today, /70 & HR 54. Repeat BP . No cardiac complaints. Meds & allergies reviewed. Going to have BMP drawn this am.     Per Dr Jessica Kaba, resume Lisinopril 5mg daily. Get BMP drawn today. RTO in 2 weeks for a BP/HR check. (Will monitor BMP).

## 2022-10-11 ENCOUNTER — HOSPITAL ENCOUNTER (OUTPATIENT)
Age: 82
Discharge: HOME | End: 2022-10-11
Payer: MEDICARE

## 2022-10-11 DIAGNOSIS — I47.29 NONSUSTAINED VENTRICULAR TACHYCARDIA: Primary | ICD-10-CM

## 2022-10-11 DIAGNOSIS — I47.29 NONSUSTAINED VENTRICULAR TACHYCARDIA: ICD-10-CM

## 2022-10-11 LAB
ANION GAP SERPL CALCULATED.3IONS-SCNC: 10 MMOL/L (ref 7–16)
BUN BLDV-MCNC: 35 MG/DL (ref 6–23)
CALCIUM SERPL-MCNC: 9.4 MG/DL (ref 8.6–10.2)
CHLORIDE BLD-SCNC: 107 MMOL/L (ref 98–107)
CO2: 22 MMOL/L (ref 22–29)
CREAT SERPL-MCNC: 1.6 MG/DL (ref 0.7–1.2)
GFR AFRICAN AMERICAN: 51
GFR NON-AFRICAN AMERICAN: 42 ML/MIN/1.73
GLUCOSE BLD-MCNC: 87 MG/DL (ref 74–99)
POTASSIUM SERPL-SCNC: 5.1 MMOL/L (ref 3.5–5)
SODIUM BLD-SCNC: 139 MMOL/L (ref 132–146)

## 2022-10-11 PROCEDURE — 36415 COLL VENOUS BLD VENIPUNCTURE: CPT

## 2022-10-11 PROCEDURE — 80048 BASIC METABOLIC PNL TOTAL CA: CPT

## 2022-10-24 ENCOUNTER — TELEPHONE (OUTPATIENT)
Dept: CARDIOLOGY CLINIC | Age: 82
End: 2022-10-24

## 2022-10-24 NOTE — TELEPHONE ENCOUNTER
Dr Roberta Millan, just [id number] [address] patient called in stated he tested positive with COVID and his PCP prescribed him Paxlovid. He wanted to know if there was anything else he needed to do has far as cardiac treatment.  Please Laura Miller

## 2022-11-10 RX ORDER — METOPROLOL SUCCINATE 50 MG/1
50 TABLET, EXTENDED RELEASE ORAL NIGHTLY
Qty: 90 TABLET | Refills: 3 | Status: SHIPPED | OUTPATIENT
Start: 2022-11-10

## 2023-03-24 ENCOUNTER — OFFICE VISIT (OUTPATIENT)
Dept: NON INVASIVE DIAGNOSTICS | Age: 83
End: 2023-03-24

## 2023-03-24 VITALS
HEART RATE: 57 BPM | BODY MASS INDEX: 20.24 KG/M2 | DIASTOLIC BLOOD PRESSURE: 78 MMHG | RESPIRATION RATE: 16 BRPM | HEIGHT: 71 IN | SYSTOLIC BLOOD PRESSURE: 138 MMHG | WEIGHT: 144.6 LBS | OXYGEN SATURATION: 97 %

## 2023-03-24 DIAGNOSIS — I42.8 NICM (NONISCHEMIC CARDIOMYOPATHY) (HCC): Primary | ICD-10-CM

## 2023-03-24 DIAGNOSIS — I47.29 NSVT (NONSUSTAINED VENTRICULAR TACHYCARDIA) (HCC): ICD-10-CM

## 2023-03-24 NOTE — PROGRESS NOTES
edema. Psychiatric: Normal mood and affect. Thought content normal.     Pertinent Labs:  CBC:   WBC   Date Value Ref Range Status   06/15/2022 5.1 4.5 - 11.5 E9/L Final   06/14/2022 5.3 4.5 - 11.5 E9/L Final   06/13/2022 4.4 (L) 4.5 - 11.5 E9/L Final     Hemoglobin   Date Value Ref Range Status   06/15/2022 11.1 (L) 12.5 - 16.5 g/dL Final   06/14/2022 11.7 (L) 12.5 - 16.5 g/dL Final   06/13/2022 11.2 (L) 12.5 - 16.5 g/dL Final     Hematocrit   Date Value Ref Range Status   06/15/2022 34.5 (L) 37.0 - 54.0 % Final   06/14/2022 36.0 (L) 37.0 - 54.0 % Final   06/13/2022 35.9 (L) 37.0 - 54.0 % Final     Platelets   Date Value Ref Range Status   06/15/2022 185 130 - 450 E9/L Final   06/14/2022 182 130 - 450 E9/L Final   06/13/2022 158 130 - 450 E9/L Final     BMP:   Lab Results   Component Value Date/Time     10/11/2022 01:41 PM    K 5.1 10/11/2022 01:41 PM    K 4.2 06/15/2022 05:08 AM     10/11/2022 01:41 PM    CO2 22 10/11/2022 01:41 PM    BUN 35 10/11/2022 01:41 PM    CREATININE 1.6 10/11/2022 01:41 PM    GLUCOSE 87 10/11/2022 01:41 PM    CALCIUM 9.4 10/11/2022 01:41 PM      ABGs: No results found for: PHART, PO2ART, YXX2PCB  INR: No results found for: INR  BNP: No results found for: BNP  TSH:   Lab Results   Component Value Date    TSH 1.940 12/06/2018      Cardiac Injury Profile: No results found for: CKTOTAL, CKMB, CKMBINDEX, TROPONINI  Lipid Profile: No results found for: TRIG, HDL, LDLCALC, CHOL   Hemoglobin A1C: No results found for: LABA1C      Pertinent Cardiac Testing:       ECG 3/24/2023: NSR, PVCs    Echocardiography  6/15/22  Conclusions      Summary   Normal left ventricle size and systolic function. Ejection fraction is visually estimated at 55-60%. Septal motion consistent with post bypass surgery. Moderate concentric left ventricular hypertrophy. Indeterminate diastolic function. The left atrium is severely dilated. Mildly dilated right ventricle.    Right ventricle global

## 2023-03-28 ENCOUNTER — HOSPITAL ENCOUNTER (OUTPATIENT)
Age: 83
Discharge: HOME | End: 2023-03-28
Payer: MEDICARE

## 2023-03-28 DIAGNOSIS — I42.8 NICM (NONISCHEMIC CARDIOMYOPATHY) (HCC): ICD-10-CM

## 2023-03-28 LAB
ANION GAP SERPL CALCULATED.3IONS-SCNC: 10 MMOL/L (ref 7–16)
BUN SERPL-MCNC: 32 MG/DL (ref 6–23)
CALCIUM SERPL-MCNC: 9 MG/DL (ref 8.6–10.2)
CHLORIDE SERPL-SCNC: 108 MMOL/L (ref 98–107)
CO2 SERPL-SCNC: 24 MMOL/L (ref 22–29)
CREAT SERPL-MCNC: 1.4 MG/DL (ref 0.7–1.2)
GLUCOSE SERPL-MCNC: 87 MG/DL (ref 74–99)
POTASSIUM SERPL-SCNC: 4.5 MMOL/L (ref 3.5–5)
SODIUM SERPL-SCNC: 142 MMOL/L (ref 132–146)

## 2023-03-28 PROCEDURE — 36415 COLL VENOUS BLD VENIPUNCTURE: CPT

## 2023-03-28 PROCEDURE — 80048 BASIC METABOLIC PNL TOTAL CA: CPT

## 2023-08-07 RX ORDER — LISINOPRIL 10 MG/1
TABLET ORAL
Qty: 90 TABLET | Refills: 0 | OUTPATIENT
Start: 2023-08-07

## 2023-08-08 RX ORDER — LISINOPRIL 10 MG/1
TABLET ORAL
Qty: 90 TABLET | Refills: 0 | OUTPATIENT
Start: 2023-08-08

## 2023-08-09 RX ORDER — LISINOPRIL 5 MG/1
5 TABLET ORAL EVERY MORNING
Qty: 30 TABLET | Refills: 3 | Status: SHIPPED | OUTPATIENT
Start: 2023-08-09

## 2023-09-10 PROCEDURE — 93297 REM INTERROG DEV EVAL ICPMS: CPT | Performed by: INTERNAL MEDICINE

## 2023-09-10 PROCEDURE — G2066 INTER DEVC REMOTE 30D: HCPCS | Performed by: INTERNAL MEDICINE

## 2023-09-11 PROCEDURE — 93295 DEV INTERROG REMOTE 1/2/MLT: CPT | Performed by: INTERNAL MEDICINE

## 2023-09-11 PROCEDURE — 93296 REM INTERROG EVL PM/IDS: CPT | Performed by: INTERNAL MEDICINE

## 2023-09-28 ENCOUNTER — NURSE ONLY (OUTPATIENT)
Dept: NON INVASIVE DIAGNOSTICS | Age: 83
End: 2023-09-28

## 2023-09-28 DIAGNOSIS — I47.29 NSVT (NONSUSTAINED VENTRICULAR TACHYCARDIA) (HCC): Primary | ICD-10-CM

## 2023-09-28 DIAGNOSIS — Z95.810 PRESENCE OF AUTOMATIC CARDIOVERTER/DEFIBRILLATOR (AICD): ICD-10-CM

## 2023-11-13 ENCOUNTER — OFFICE VISIT (OUTPATIENT)
Dept: CARDIOLOGY CLINIC | Age: 83
End: 2023-11-13

## 2023-11-13 VITALS
HEIGHT: 71 IN | WEIGHT: 143.8 LBS | HEART RATE: 57 BPM | BODY MASS INDEX: 20.13 KG/M2 | DIASTOLIC BLOOD PRESSURE: 74 MMHG | RESPIRATION RATE: 16 BRPM | SYSTOLIC BLOOD PRESSURE: 118 MMHG

## 2023-11-13 DIAGNOSIS — N18.31 STAGE 3A CHRONIC KIDNEY DISEASE (HCC): ICD-10-CM

## 2023-11-13 DIAGNOSIS — I44.7 LBBB (LEFT BUNDLE BRANCH BLOCK): ICD-10-CM

## 2023-11-13 DIAGNOSIS — I47.29 NONSUSTAINED VENTRICULAR TACHYCARDIA (HCC): ICD-10-CM

## 2023-11-13 DIAGNOSIS — Z95.810 S/P ICD (INTERNAL CARDIAC DEFIBRILLATOR) PROCEDURE: ICD-10-CM

## 2023-11-13 DIAGNOSIS — G89.29 CHRONIC RIGHT-SIDED LOW BACK PAIN WITH RIGHT-SIDED SCIATICA: ICD-10-CM

## 2023-11-13 DIAGNOSIS — Z90.79 H/O TRANSURETHRAL RESECTION OF PROSTATE: ICD-10-CM

## 2023-11-13 DIAGNOSIS — Z98.890 HISTORY OF MITRAL VALVE REPAIR: ICD-10-CM

## 2023-11-13 DIAGNOSIS — Z86.79 H/O CARDIOMYOPATHY: ICD-10-CM

## 2023-11-13 DIAGNOSIS — I38 VHD (VALVULAR HEART DISEASE): Primary | ICD-10-CM

## 2023-11-13 DIAGNOSIS — I44.0 FIRST DEGREE ATRIOVENTRICULAR BLOCK: ICD-10-CM

## 2023-11-13 DIAGNOSIS — Z98.890 H/O TRANSURETHRAL RESECTION OF PROSTATE: ICD-10-CM

## 2023-11-13 DIAGNOSIS — M54.41 CHRONIC RIGHT-SIDED LOW BACK PAIN WITH RIGHT-SIDED SCIATICA: ICD-10-CM

## 2023-11-13 DIAGNOSIS — N32.3 BLADDER DIVERTICULUM: ICD-10-CM

## 2023-11-13 DIAGNOSIS — Z86.19 HISTORY OF SHINGLES: ICD-10-CM

## 2023-11-13 DIAGNOSIS — Z79.01 ANTICOAGULATED BY ANTICOAGULATION TREATMENT: ICD-10-CM

## 2023-11-13 DIAGNOSIS — I48.0 PAF (PAROXYSMAL ATRIAL FIBRILLATION) (HCC): ICD-10-CM

## 2023-11-13 RX ORDER — LISINOPRIL 5 MG/1
5 TABLET ORAL EVERY MORNING
Qty: 90 TABLET | Refills: 3 | Status: SHIPPED | OUTPATIENT
Start: 2023-11-13

## 2023-11-13 NOTE — PROGRESS NOTES
OFFICE VISIT        PRIMARY CARE PHYSICIAN:    Concha Hoskins DO       ALLERGIES / SENSITIVITIES:    Allergies   Allergen Reactions    Iodine Swelling     Facial Swelling and Throat Closes Up    Shrimp (Diagnostic)     Shrimp Extract Allergy Skin Test Swelling          REVIEWED MEDICATIONS:      Current Outpatient Medications:     lisinopril (PRINIVIL;ZESTRIL) 5 MG tablet, Take 1 tablet by mouth every morning, Disp: 90 tablet, Rfl: 3    apixaban (ELIQUIS) 5 MG TABS tablet, Take 1 tablet by mouth 2 times daily, Disp: 60 tablet, Rfl: 5    metoprolol succinate (TOPROL XL) 50 MG extended release tablet, Take 1 tablet by mouth nightly, Disp: 90 tablet, Rfl: 3    Blood Pressure KIT, 1 kit by Does not apply route daily, Disp: 1 kit, Rfl: 0        S: REASON FOR VISIT:    Valvular heart disease and history of cardiomyopathy, S/P mitral valve repair in 1995. History of nonsustained ventricular tachycardia and inducible sustained polymorphic ventricular tachycardia degenerating into ventricular fibrillation on electrophysiologic testing, status post dual chamber ICD implantation in 4/2019. Paroxysmal atrial fibrillation on chronic anticoagulation with EliquisRajat Levy is a pleasant, 21-year-old male who was last seen by me in the office in 8/2022. He saw Electrophysiology, Dr. Linda Parker, in 3/2023. He remains stable from a cardiac standpoint. He denies chest pain or significant dyspnea with his limited activities. He denies orthopnea, PND's or lower extremity swelling. He denies palpitations, dizziness, presyncope or syncope or discharges from his ICD. REVIEW OF SYSTEMS:    CONSTITUTIONAL:  Denies fevers, chills, night sweats or fatigue. HEENT:  Denies any unusual headaches. Denies recent changes in hearing or vision. Denies dysphagia, hoarseness, hemoptysis, hematemesis or epistaxis. ENDOCRINE:  Denies polyphagia, polydipsia or polyuria. Denies heat or cold intolerance.   MUSCULOSKELETAL:  He has

## 2023-12-05 ENCOUNTER — TELEPHONE (OUTPATIENT)
Dept: NON INVASIVE DIAGNOSTICS | Age: 83
End: 2023-12-05

## 2023-12-05 NOTE — TELEPHONE ENCOUNTER
Eliquis decreased to 2.5mg twice a day per Dr Juan Cook to [facility] regarding above, verbalizes understanding

## 2024-01-07 PROCEDURE — 93296 REM INTERROG EVL PM/IDS: CPT | Performed by: INTERNAL MEDICINE

## 2024-01-07 PROCEDURE — 93295 DEV INTERROG REMOTE 1/2/MLT: CPT | Performed by: INTERNAL MEDICINE

## 2024-01-07 PROCEDURE — 93297 REM INTERROG DEV EVAL ICPMS: CPT | Performed by: INTERNAL MEDICINE

## 2024-02-21 ENCOUNTER — HOSPITAL ENCOUNTER (OUTPATIENT)
Age: 84
Discharge: HOME | End: 2024-02-21
Payer: MEDICARE

## 2024-02-21 DIAGNOSIS — I42.8 NICM (NONISCHEMIC CARDIOMYOPATHY) (HCC): ICD-10-CM

## 2024-02-21 LAB
ANION GAP SERPL CALCULATED.3IONS-SCNC: 12 MMOL/L (ref 7–16)
BUN SERPL-MCNC: 26 MG/DL (ref 6–23)
CALCIUM SERPL-MCNC: 9 MG/DL (ref 8.6–10.2)
CHLORIDE SERPL-SCNC: 106 MMOL/L (ref 98–107)
CO2 SERPL-SCNC: 22 MMOL/L (ref 22–29)
CREAT SERPL-MCNC: 1.5 MG/DL (ref 0.7–1.2)
GFR SERPL CREATININE-BSD FRML MDRD: 47 ML/MIN/1.73M2
GLUCOSE SERPL-MCNC: 82 MG/DL (ref 74–99)
POTASSIUM SERPL-SCNC: 4.9 MMOL/L (ref 3.5–5)
SODIUM SERPL-SCNC: 140 MMOL/L (ref 132–146)

## 2024-02-21 PROCEDURE — 36415 COLL VENOUS BLD VENIPUNCTURE: CPT

## 2024-02-21 PROCEDURE — 80048 BASIC METABOLIC PNL TOTAL CA: CPT

## 2024-04-03 ENCOUNTER — NURSE ONLY (OUTPATIENT)
Dept: NON INVASIVE DIAGNOSTICS | Age: 84
End: 2024-04-03
Payer: MEDICARE

## 2024-04-03 DIAGNOSIS — I47.29 NSVT (NONSUSTAINED VENTRICULAR TACHYCARDIA) (HCC): ICD-10-CM

## 2024-04-03 DIAGNOSIS — I42.8 NICM (NONISCHEMIC CARDIOMYOPATHY) (HCC): ICD-10-CM

## 2024-04-03 DIAGNOSIS — I44.7 COMPLETE LEFT BUNDLE BRANCH BLOCK: ICD-10-CM

## 2024-04-03 DIAGNOSIS — Z95.810 PRESENCE OF AUTOMATIC CARDIOVERTER/DEFIBRILLATOR (AICD): Primary | ICD-10-CM

## 2024-04-03 PROCEDURE — 93283 PRGRMG EVAL IMPLANTABLE DFB: CPT | Performed by: INTERNAL MEDICINE

## 2024-07-01 RX ORDER — METOPROLOL SUCCINATE 50 MG/1
50 TABLET, EXTENDED RELEASE ORAL NIGHTLY
Qty: 90 TABLET | Refills: 1 | Status: SHIPPED | OUTPATIENT
Start: 2024-07-01

## 2024-11-06 ENCOUNTER — TELEPHONE (OUTPATIENT)
Dept: CARDIOLOGY | Age: 84
End: 2024-11-06

## 2024-11-06 ENCOUNTER — OFFICE VISIT (OUTPATIENT)
Dept: CARDIOLOGY CLINIC | Age: 84
End: 2024-11-06

## 2024-11-06 ENCOUNTER — NURSE ONLY (OUTPATIENT)
Dept: NON INVASIVE DIAGNOSTICS | Age: 84
End: 2024-11-06

## 2024-11-06 VITALS
RESPIRATION RATE: 18 BRPM | SYSTOLIC BLOOD PRESSURE: 128 MMHG | HEART RATE: 63 BPM | HEIGHT: 71 IN | WEIGHT: 142 LBS | BODY MASS INDEX: 19.88 KG/M2 | DIASTOLIC BLOOD PRESSURE: 80 MMHG

## 2024-11-06 DIAGNOSIS — N32.3 BLADDER DIVERTICULUM: ICD-10-CM

## 2024-11-06 DIAGNOSIS — I44.0 FIRST DEGREE ATRIOVENTRICULAR BLOCK: ICD-10-CM

## 2024-11-06 DIAGNOSIS — G89.29 CHRONIC RIGHT-SIDED LOW BACK PAIN WITH RIGHT-SIDED SCIATICA: ICD-10-CM

## 2024-11-06 DIAGNOSIS — I48.0 PAF (PAROXYSMAL ATRIAL FIBRILLATION) (HCC): ICD-10-CM

## 2024-11-06 DIAGNOSIS — D64.9 MILD ANEMIA: ICD-10-CM

## 2024-11-06 DIAGNOSIS — I44.7 COMPLETE LEFT BUNDLE BRANCH BLOCK: ICD-10-CM

## 2024-11-06 DIAGNOSIS — I47.29 NONSUSTAINED VENTRICULAR TACHYCARDIA (HCC): ICD-10-CM

## 2024-11-06 DIAGNOSIS — Z86.19 HISTORY OF SHINGLES: ICD-10-CM

## 2024-11-06 DIAGNOSIS — Z98.890 HISTORY OF MITRAL VALVE REPAIR: ICD-10-CM

## 2024-11-06 DIAGNOSIS — N18.31 STAGE 3A CHRONIC KIDNEY DISEASE (HCC): ICD-10-CM

## 2024-11-06 DIAGNOSIS — E78.00 HYPERCHOLESTEREMIA: ICD-10-CM

## 2024-11-06 DIAGNOSIS — Z90.79 H/O TRANSURETHRAL RESECTION OF PROSTATE: ICD-10-CM

## 2024-11-06 DIAGNOSIS — Z95.810 S/P ICD (INTERNAL CARDIAC DEFIBRILLATOR) PROCEDURE: ICD-10-CM

## 2024-11-06 DIAGNOSIS — I38 VHD (VALVULAR HEART DISEASE): Primary | ICD-10-CM

## 2024-11-06 DIAGNOSIS — Z95.810 PRESENCE OF AUTOMATIC CARDIOVERTER/DEFIBRILLATOR (AICD): Primary | ICD-10-CM

## 2024-11-06 DIAGNOSIS — Z79.01 ANTICOAGULATED BY ANTICOAGULATION TREATMENT: ICD-10-CM

## 2024-11-06 DIAGNOSIS — I44.7 LBBB (LEFT BUNDLE BRANCH BLOCK): ICD-10-CM

## 2024-11-06 DIAGNOSIS — M54.41 CHRONIC RIGHT-SIDED LOW BACK PAIN WITH RIGHT-SIDED SCIATICA: ICD-10-CM

## 2024-11-06 DIAGNOSIS — I47.29 NSVT (NONSUSTAINED VENTRICULAR TACHYCARDIA) (HCC): ICD-10-CM

## 2024-11-06 DIAGNOSIS — I42.8 NICM (NONISCHEMIC CARDIOMYOPATHY) (HCC): ICD-10-CM

## 2024-11-06 DIAGNOSIS — Z98.890 H/O TRANSURETHRAL RESECTION OF PROSTATE: ICD-10-CM

## 2024-11-06 DIAGNOSIS — Z86.79 H/O CARDIOMYOPATHY: ICD-10-CM

## 2024-11-06 DIAGNOSIS — I83.93 ASYMPTOMATIC VARICOSE VEINS OF BOTH LOWER EXTREMITIES: ICD-10-CM

## 2024-11-06 RX ORDER — ATORVASTATIN CALCIUM 20 MG/1
20 TABLET, FILM COATED ORAL DAILY
COMMUNITY
Start: 2024-05-15

## 2024-11-06 NOTE — TELEPHONE ENCOUNTER
JONO to schedule echocardiogram before 12/11/24.  Offered Garcia office on VM.    Electronically signed by Dalia Mac on 11/6/2024 at 11:25 AM

## 2024-11-06 NOTE — PROGRESS NOTES
polydipsia or polyuria.  Denies heat or cold intolerance.  MUSCULOSKELETAL:  He has right-sided low back pain with right-sided lower extremity sciatica.   SKIN:  Denies rashes, ulcers or itching.  HEME/LYMPH:  Denies any palpable lymph nodes, bleeding or easy bruisability.  HEART:  As above.  LUNGS:  Denies cough or sputum production.  GI: Denies abdominal pain, nausea, vomiting, diarrhea, constipation, rectal bleeding or tarry stools.  :  Denies hematuria or dysuria.  PSYCHIATRIC:  Denies mood changes, anxiety or depression.  NEUROLOGIC:  Denies memory loss, motor weakness, numbness, tingling or tremors.       PAST MEDICAL/SURGICAL HISTORY:    1.  History of prostate enlargement, S/P TURP 7/9/2018, after which the patient had problems with recurrent urinary retention, urinary infection and hematuria and underwent cystoscopy on 10/12/2018 and again in 01/2019 at [facility], at which time scar tissue was removed. He also has bladder diverticulum.    2.  S/P mitral valve repair surgery at [facility] in 1995.  3.  Cardiomyopathy.  4.  Left bundle branch block.   5.  Echocardiogram done on 12/6/2018, was reported as showing normal left ventricular size with mild concentric left ventricular hypertrophy with paradoxical septal motion consistent with left bundle branch block with an estimated ejection fraction of 45-50%.  Normal right ventricular size and function, moderately dilated left atrium and mildly dilated right atrium.  Moderate mitral annular calcification, S/P mitral annular ring insertion with moderate mitral stenosis with no evidence of mitral regurgitation.  Mild-to-moderate tricuspid regurgitation.  Mild pulmonary hypertension with an RVSP of 40 mmHg. Moderately sclerotic aortic valve with mild aortic regurgitation.    6.  Nonsustained ventricular tachycardia.  a.  Inducible sustained polymorphic VT degenerating into ventricular fibrillation on electrophysiologic testing.    b.

## 2024-12-06 ENCOUNTER — HOSPITAL ENCOUNTER (OUTPATIENT)
Dept: CARDIOLOGY | Age: 84
Discharge: HOME | End: 2024-12-08
Attending: INTERNAL MEDICINE
Payer: MEDICARE

## 2024-12-06 VITALS — WEIGHT: 142 LBS | BODY MASS INDEX: 19.88 KG/M2 | HEIGHT: 71 IN

## 2024-12-06 DIAGNOSIS — I38 VHD (VALVULAR HEART DISEASE): ICD-10-CM

## 2024-12-06 LAB
ECHO AO ASC DIAM: 4.2 CM
ECHO AO ASCENDING AORTA INDEX: 2.31 CM/M2
ECHO AV AREA PEAK VELOCITY: 3.4 CM2
ECHO AV AREA VTI: 3.4 CM2
ECHO AV AREA/BSA PEAK VELOCITY: 1.9 CM2/M2
ECHO AV AREA/BSA VTI: 1.9 CM2/M2
ECHO AV CUSP MM: 2 CM
ECHO AV MEAN GRADIENT: 4 MMHG
ECHO AV MEAN VELOCITY: 0.9 M/S
ECHO AV PEAK GRADIENT: 5 MMHG
ECHO AV PEAK VELOCITY: 1.2 M/S
ECHO AV VELOCITY RATIO: 0.92
ECHO AV VTI: 25.7 CM
ECHO BSA: 1.8 M2
ECHO LA DIAMETER INDEX: 3.08 CM/M2
ECHO LA DIAMETER: 5.6 CM
ECHO LA VOL A-L A2C: 105 ML (ref 18–58)
ECHO LA VOL A-L A4C: 116 ML (ref 18–58)
ECHO LA VOL MOD A2C: 102 ML (ref 18–58)
ECHO LA VOL MOD A4C: 112 ML (ref 18–58)
ECHO LA VOLUME AREA LENGTH: 113 ML
ECHO LA VOLUME INDEX A-L A2C: 58 ML/M2 (ref 16–34)
ECHO LA VOLUME INDEX A-L A4C: 64 ML/M2 (ref 16–34)
ECHO LA VOLUME INDEX AREA LENGTH: 62 ML/M2 (ref 16–34)
ECHO LA VOLUME INDEX MOD A2C: 56 ML/M2 (ref 16–34)
ECHO LA VOLUME INDEX MOD A4C: 62 ML/M2 (ref 16–34)
ECHO LV EF PHYSICIAN: 45 %
ECHO LV FRACTIONAL SHORTENING: 24 % (ref 28–44)
ECHO LV INTERNAL DIMENSION DIASTOLE INDEX: 2.53 CM/M2
ECHO LV INTERNAL DIMENSION DIASTOLIC: 4.6 CM (ref 4.2–5.9)
ECHO LV INTERNAL DIMENSION SYSTOLIC INDEX: 1.92 CM/M2
ECHO LV INTERNAL DIMENSION SYSTOLIC: 3.5 CM
ECHO LV ISOVOLUMETRIC RELAXATION TIME (IVRT): 141.9 MS
ECHO LV IVSD: 1.7 CM (ref 0.6–1)
ECHO LV IVSS: 2.5 CM
ECHO LV MASS 2D: 299.5 G (ref 88–224)
ECHO LV MASS INDEX 2D: 164.5 G/M2 (ref 49–115)
ECHO LV POSTERIOR WALL DIASTOLIC: 1.4 CM (ref 0.6–1)
ECHO LV POSTERIOR WALL SYSTOLIC: 2.1 CM
ECHO LV RELATIVE WALL THICKNESS RATIO: 0.61
ECHO LVOT AREA: 3.5 CM2
ECHO LVOT AV VTI INDEX: 0.96
ECHO LVOT DIAM: 2.1 CM
ECHO LVOT MEAN GRADIENT: 3 MMHG
ECHO LVOT PEAK GRADIENT: 5 MMHG
ECHO LVOT PEAK VELOCITY: 1.1 M/S
ECHO LVOT STROKE VOLUME INDEX: 46.8 ML/M2
ECHO LVOT SV: 85.2 ML
ECHO LVOT VTI: 24.6 CM
ECHO MV "A" WAVE DURATION: 221.5 MSEC
ECHO MV A VELOCITY: 1.49 M/S
ECHO MV AREA PHT: 1.8 CM2
ECHO MV AREA VTI: 1.7 CM2
ECHO MV E DECELERATION TIME (DT): 462.7 MS
ECHO MV E VELOCITY: 0.96 M/S
ECHO MV E/A RATIO: 0.64
ECHO MV LVOT VTI INDEX: 2.02
ECHO MV MAX VELOCITY: 1.5 M/S
ECHO MV MEAN GRADIENT: 4 MMHG
ECHO MV MEAN VELOCITY: 0.9 M/S
ECHO MV PEAK GRADIENT: 9 MMHG
ECHO MV PRESSURE HALF TIME (PHT): 120 MS
ECHO MV VTI: 49.7 CM
ECHO PV MAX VELOCITY: 0.9 M/S
ECHO PV MEAN GRADIENT: 2 MMHG
ECHO PV MEAN VELOCITY: 0.6 M/S
ECHO PV PEAK GRADIENT: 3 MMHG
ECHO PV VTI: 17.6 CM
ECHO PVEIN A DURATION: 128 MS
ECHO PVEIN A VELOCITY: 0.2 M/S
ECHO PVEIN PEAK D VELOCITY: 0.3 M/S
ECHO PVEIN PEAK S VELOCITY: 0.5 M/S
ECHO PVEIN S/D RATIO: 1.7
ECHO RV INTERNAL DIMENSION: 3.9 CM
ECHO TV REGURGITANT MAX VELOCITY: 2.75 M/S
ECHO TV REGURGITANT PEAK GRADIENT: 30 MMHG

## 2024-12-06 PROCEDURE — 93306 TTE W/DOPPLER COMPLETE: CPT

## 2024-12-06 PROCEDURE — 93306 TTE W/DOPPLER COMPLETE: CPT | Performed by: INTERNAL MEDICINE

## 2024-12-09 RX ORDER — LISINOPRIL 5 MG/1
5 TABLET ORAL EVERY MORNING
Qty: 90 TABLET | Refills: 0 | Status: SHIPPED | OUTPATIENT
Start: 2024-12-09

## 2024-12-11 ENCOUNTER — PREP FOR PROCEDURE (OUTPATIENT)
Dept: NON INVASIVE DIAGNOSTICS | Age: 84
End: 2024-12-11

## 2024-12-11 ENCOUNTER — OFFICE VISIT (OUTPATIENT)
Dept: NON INVASIVE DIAGNOSTICS | Age: 84
End: 2024-12-11
Payer: MEDICARE

## 2024-12-11 ENCOUNTER — NURSE ONLY (OUTPATIENT)
Dept: NON INVASIVE DIAGNOSTICS | Age: 84
End: 2024-12-11

## 2024-12-11 VITALS
WEIGHT: 143 LBS | RESPIRATION RATE: 17 BRPM | HEIGHT: 70 IN | DIASTOLIC BLOOD PRESSURE: 68 MMHG | BODY MASS INDEX: 20.47 KG/M2 | SYSTOLIC BLOOD PRESSURE: 112 MMHG | HEART RATE: 57 BPM

## 2024-12-11 DIAGNOSIS — Z95.810 PRESENCE OF AUTOMATIC CARDIOVERTER/DEFIBRILLATOR (AICD): ICD-10-CM

## 2024-12-11 DIAGNOSIS — I47.29 NSVT (NONSUSTAINED VENTRICULAR TACHYCARDIA) (HCC): ICD-10-CM

## 2024-12-11 DIAGNOSIS — I42.8 NICM (NONISCHEMIC CARDIOMYOPATHY) (HCC): Primary | ICD-10-CM

## 2024-12-11 DIAGNOSIS — I44.7 COMPLETE LEFT BUNDLE BRANCH BLOCK: ICD-10-CM

## 2024-12-11 PROCEDURE — 1123F ACP DISCUSS/DSCN MKR DOCD: CPT | Performed by: INTERNAL MEDICINE

## 2024-12-11 PROCEDURE — 93000 ELECTROCARDIOGRAM COMPLETE: CPT | Performed by: INTERNAL MEDICINE

## 2024-12-11 PROCEDURE — 99214 OFFICE O/P EST MOD 30 MIN: CPT | Performed by: INTERNAL MEDICINE

## 2024-12-11 PROCEDURE — 1160F RVW MEDS BY RX/DR IN RCRD: CPT | Performed by: INTERNAL MEDICINE

## 2024-12-11 PROCEDURE — 1159F MED LIST DOCD IN RCRD: CPT | Performed by: INTERNAL MEDICINE

## 2024-12-11 RX ORDER — SODIUM CHLORIDE 0.9 % (FLUSH) 0.9 %
5-40 SYRINGE (ML) INJECTION EVERY 12 HOURS SCHEDULED
Status: CANCELLED | OUTPATIENT
Start: 2024-12-12

## 2024-12-11 RX ORDER — SODIUM CHLORIDE 0.9 % (FLUSH) 0.9 %
5-40 SYRINGE (ML) INJECTION PRN
Status: CANCELLED | OUTPATIENT
Start: 2024-12-12

## 2024-12-11 RX ORDER — SODIUM CHLORIDE 9 MG/ML
INJECTION, SOLUTION INTRAVENOUS PRN
Status: CANCELLED | OUTPATIENT
Start: 2024-12-12

## 2024-12-11 RX ORDER — LISINOPRIL 5 MG/1
5 TABLET ORAL DAILY
Qty: 90 TABLET | Refills: 3 | Status: SHIPPED | OUTPATIENT
Start: 2024-12-11

## 2024-12-11 RX ORDER — METOPROLOL SUCCINATE 50 MG/1
50 TABLET, EXTENDED RELEASE ORAL DAILY
Qty: 90 TABLET | Refills: 3 | Status: SHIPPED | OUTPATIENT
Start: 2024-12-11

## 2024-12-11 RX ORDER — PREDNISONE 20 MG/1
40 TABLET ORAL DAILY
Qty: 2 TABLET | Refills: 0 | Status: SHIPPED | OUTPATIENT
Start: 2024-12-11 | End: 2024-12-13

## 2024-12-11 NOTE — PROGRESS NOTES
Cardiac Electrophysiology Outpatient Progress Note    Cam Bangura  1940  Date of Service: 12/11/2024  Referring Provider/PCP: Fernando Alba DO    Chief Complaint   Patient presents with    Cardiomyopathy         Patient Active Problem List    Diagnosis Date Noted    Acute kidney injury (HCC)      Priority: Medium    Weakness 06/12/2022     Priority: Medium    Pure hypercholesterolemia 08/18/2021    BPH without obstruction/lower urinary tract symptoms 08/18/2021    Urethral pain 07/25/2019    Gross hematuria 07/25/2019    History of implantable cardioverter-defibrillator (ICD) placement 04/02/2019    S/P ICD (internal cardiac defibrillator) procedure 04/02/2019    Nonsustained ventricular tachycardia (HCC) 12/05/2018    Near syncope 12/05/2018    Mitral regurgitation 12/05/2018    Ventricular tachycardia (paroxysmal) (HCC) 12/05/2018     [facility]    Mitral valve repair for acute mitral regurgitation in 1995.  Surgery was performed at [facility].    2.  Extremely irregular cardiology follow-up thereafter.  3.  Chronic Left bundle-branch block   4.   Transurethral resection procedure performed in July 2018 with recurrent complications related to persistent stricture at the bladder neck and multiple UTIs.    5.   Most recent urological procedure was performed in October 2018 at [facility].  6.   Multiple episodes of ventricular ectopy were noted during that procedure.  7.   The patient was thereafter seen by his primary care physician who performed a Holter monitor, which revealed frequent premature ventricular contractions, but there was also nonsustained ventricular tachycardia with one episode lasting for 6 beats noted.  The patient was therefore referred to my office urgently for evaluation.  8.   The patient has had episodes of near syncope, several weeks prior to his last evaluation by me in December 2018.    9.   Recurrent near syncope led to electrophysiology testing in April

## 2024-12-12 ENCOUNTER — HOSPITAL ENCOUNTER (OUTPATIENT)
Age: 84
Setting detail: OUTPATIENT SURGERY
Discharge: HOME | End: 2024-12-12
Attending: INTERNAL MEDICINE | Admitting: INTERNAL MEDICINE
Payer: MEDICARE

## 2024-12-12 VITALS
SYSTOLIC BLOOD PRESSURE: 159 MMHG | HEART RATE: 57 BPM | DIASTOLIC BLOOD PRESSURE: 83 MMHG | RESPIRATION RATE: 20 BRPM | OXYGEN SATURATION: 99 % | TEMPERATURE: 98.1 F

## 2024-12-12 DIAGNOSIS — I47.29 PAROXYSMAL VENTRICULAR TACHYCARDIA (HCC): ICD-10-CM

## 2024-12-12 PROBLEM — I87.1 SUBCLAVIAN VEIN STENOSIS, LEFT: Status: ACTIVE | Noted: 2024-12-12

## 2024-12-12 LAB
ANION GAP SERPL CALCULATED.3IONS-SCNC: 7 MMOL/L (ref 7–16)
BUN SERPL-MCNC: 27 MG/DL (ref 6–23)
CALCIUM SERPL-MCNC: 9 MG/DL (ref 8.6–10.2)
CHLORIDE SERPL-SCNC: 104 MMOL/L (ref 98–107)
CO2 SERPL-SCNC: 25 MMOL/L (ref 22–29)
CREAT SERPL-MCNC: 1.4 MG/DL (ref 0.7–1.2)
GFR, ESTIMATED: 51 ML/MIN/1.73M2
GLUCOSE SERPL-MCNC: 125 MG/DL (ref 74–99)
POTASSIUM SERPL-SCNC: 5.6 MMOL/L (ref 3.5–5)
SODIUM SERPL-SCNC: 136 MMOL/L (ref 132–146)

## 2024-12-12 PROCEDURE — 36005 INJECTION EXT VENOGRAPHY: CPT | Performed by: INTERNAL MEDICINE

## 2024-12-12 PROCEDURE — 7100000010 HC PHASE II RECOVERY - FIRST 15 MIN: Performed by: INTERNAL MEDICINE

## 2024-12-12 PROCEDURE — 80048 BASIC METABOLIC PNL TOTAL CA: CPT

## 2024-12-12 PROCEDURE — 6360000004 HC RX CONTRAST MEDICATION: Performed by: INTERNAL MEDICINE

## 2024-12-12 PROCEDURE — 75820 VEIN X-RAY ARM/LEG: CPT | Performed by: INTERNAL MEDICINE

## 2024-12-12 PROCEDURE — 6360000002 HC RX W HCPCS: Performed by: INTERNAL MEDICINE

## 2024-12-12 PROCEDURE — 2500000003 HC RX 250 WO HCPCS: Performed by: INTERNAL MEDICINE

## 2024-12-12 PROCEDURE — 2580000003 HC RX 258: Performed by: INTERNAL MEDICINE

## 2024-12-12 RX ORDER — SODIUM CHLORIDE 9 MG/ML
INJECTION, SOLUTION INTRAVENOUS PRN
Status: DISCONTINUED | OUTPATIENT
Start: 2024-12-12 | End: 2024-12-12 | Stop reason: HOSPADM

## 2024-12-12 RX ORDER — HYDROCORTISONE SODIUM SUCCINATE 100 MG/2ML
100 INJECTION INTRAMUSCULAR; INTRAVENOUS ONCE
Status: COMPLETED | OUTPATIENT
Start: 2024-12-12 | End: 2024-12-12

## 2024-12-12 RX ORDER — SODIUM CHLORIDE 0.9 % (FLUSH) 0.9 %
5-40 SYRINGE (ML) INJECTION PRN
Status: DISCONTINUED | OUTPATIENT
Start: 2024-12-12 | End: 2024-12-12 | Stop reason: HOSPADM

## 2024-12-12 RX ORDER — IOPAMIDOL 755 MG/ML
INJECTION, SOLUTION INTRAVASCULAR PRN
Status: DISCONTINUED | OUTPATIENT
Start: 2024-12-12 | End: 2024-12-12 | Stop reason: HOSPADM

## 2024-12-12 RX ORDER — SODIUM CHLORIDE 0.9 % (FLUSH) 0.9 %
5-40 SYRINGE (ML) INJECTION EVERY 12 HOURS SCHEDULED
Status: DISCONTINUED | OUTPATIENT
Start: 2024-12-12 | End: 2024-12-12 | Stop reason: HOSPADM

## 2024-12-12 RX ORDER — DIPHENHYDRAMINE HYDROCHLORIDE 50 MG/ML
25 INJECTION INTRAMUSCULAR; INTRAVENOUS ONCE
Status: COMPLETED | OUTPATIENT
Start: 2024-12-12 | End: 2024-12-12

## 2024-12-12 RX ADMIN — SODIUM CHLORIDE, PRESERVATIVE FREE 10 ML: 5 INJECTION INTRAVENOUS at 10:53

## 2024-12-12 RX ADMIN — SODIUM CHLORIDE 100 ML: 9 INJECTION, SOLUTION INTRAVENOUS at 10:45

## 2024-12-12 RX ADMIN — SODIUM CHLORIDE, PRESERVATIVE FREE 20 MG: 5 INJECTION INTRAVENOUS at 10:13

## 2024-12-12 RX ADMIN — DIPHENHYDRAMINE HYDROCHLORIDE 25 MG: 50 INJECTION INTRAMUSCULAR; INTRAVENOUS at 10:16

## 2024-12-12 RX ADMIN — HYDROCORTISONE SODIUM SUCCINATE 100 MG: 100 INJECTION, POWDER, FOR SOLUTION INTRAMUSCULAR; INTRAVENOUS at 10:17

## 2024-12-12 NOTE — DISCHARGE INSTRUCTIONS
Discharge Date:  12/12/2024   Discharged To: home with support    Resume Activity:  Bathing:   Tub Yes   Shower Yes  Walking:Yes  Stairs: Yes  Driving: Yes  Work: Yes  School: NA        Special Instructions: Drink at least 6 glasses of water today    Diet: No added salt.    Tobacco Cessation Counseling: Done.    Office Follow Up: Call for appointment  Office Number [phone]

## 2024-12-12 NOTE — PROGRESS NOTES
BMP drawn per order. Instructed to discharge patient as planned. Dr Roth will notify patient if any post orders regarding kidney function is needed.

## 2024-12-12 NOTE — H&P
History and physical as per office notes  See office note from 12/11/2024 for details   H&P unchanged    Plan  Subclavian venogram today  Recovery and discharge

## 2024-12-13 ENCOUNTER — TELEPHONE (OUTPATIENT)
Dept: CARDIOLOGY CLINIC | Age: 84
End: 2024-12-13

## 2024-12-13 ENCOUNTER — PREP FOR PROCEDURE (OUTPATIENT)
Dept: CARDIOLOGY CLINIC | Age: 84
End: 2024-12-13

## 2024-12-13 RX ORDER — SODIUM CHLORIDE 9 MG/ML
INJECTION, SOLUTION INTRAVENOUS PRN
Status: CANCELLED | OUTPATIENT
Start: 2025-01-15

## 2024-12-13 RX ORDER — SODIUM CHLORIDE 0.9 % (FLUSH) 0.9 %
5-40 SYRINGE (ML) INJECTION PRN
Status: CANCELLED | OUTPATIENT
Start: 2025-01-15

## 2024-12-13 RX ORDER — SODIUM CHLORIDE 0.9 % (FLUSH) 0.9 %
5-40 SYRINGE (ML) INJECTION EVERY 12 HOURS SCHEDULED
Status: CANCELLED | OUTPATIENT
Start: 2025-01-15

## 2024-12-13 NOTE — TELEPHONE ENCOUNTER
Patient is scheduled for an upgrade [employer] to a CRT-D- Adding a C/S lead.     CPT 92731  ICD I44.7    [employer] insurance: [id number]    Requesting prior auth

## 2024-12-13 NOTE — TELEPHONE ENCOUNTER
Patient is scheduled for an Upgrade to a CRT-D adding a c/s lead.   Went over instructions with patient.   Hold eliquis x2 days.   Take medications with a sip of water the morning of.   Instructions were mailed to the patient.

## 2025-01-02 RX ORDER — METOPROLOL SUCCINATE 50 MG/1
50 TABLET, EXTENDED RELEASE ORAL NIGHTLY
Qty: 90 TABLET | Refills: 3 | Status: SHIPPED | OUTPATIENT
Start: 2025-01-02

## 2025-01-13 ENCOUNTER — TELEPHONE (OUTPATIENT)
Dept: NON INVASIVE DIAGNOSTICS | Age: 85
End: 2025-01-13

## 2025-01-15 ENCOUNTER — HOSPITAL ENCOUNTER (OUTPATIENT)
Age: 85
Setting detail: OBSERVATION
Discharge: HOME | End: 2025-01-16
Attending: INTERNAL MEDICINE | Admitting: INTERNAL MEDICINE
Payer: MEDICARE

## 2025-01-15 ENCOUNTER — APPOINTMENT (OUTPATIENT)
Dept: GENERAL RADIOLOGY | Age: 85
End: 2025-01-15
Attending: INTERNAL MEDICINE
Payer: MEDICARE

## 2025-01-15 ENCOUNTER — ANESTHESIA EVENT (OUTPATIENT)
Age: 85
End: 2025-01-15
Payer: MEDICARE

## 2025-01-15 ENCOUNTER — ANESTHESIA (OUTPATIENT)
Age: 85
End: 2025-01-15
Payer: MEDICARE

## 2025-01-15 DIAGNOSIS — I44.7 COMPLETE LEFT BUNDLE BRANCH BLOCK: ICD-10-CM

## 2025-01-15 PROBLEM — I50.22 CHRONIC HEART FAILURE WITH MILDLY REDUCED EJECTION FRACTION (HFMREF, 41-49%) (HCC): Status: ACTIVE | Noted: 2025-01-15

## 2025-01-15 PROBLEM — Z95.810 S/P INTERNAL CARDIAC DEFIBRILLATOR PROCEDURE: Status: ACTIVE | Noted: 2025-01-15

## 2025-01-15 PROBLEM — I47.20 VENTRICULAR TACHYCARDIA (HCC): Status: ACTIVE | Noted: 2018-12-05

## 2025-01-15 LAB
ANION GAP SERPL CALCULATED.3IONS-SCNC: 11 MMOL/L (ref 7–16)
BUN SERPL-MCNC: 38 MG/DL (ref 6–23)
CALCIUM SERPL-MCNC: 9.4 MG/DL (ref 8.6–10.2)
CHLORIDE SERPL-SCNC: 106 MMOL/L (ref 98–107)
CO2 SERPL-SCNC: 25 MMOL/L (ref 22–29)
CREAT SERPL-MCNC: 1.7 MG/DL (ref 0.7–1.2)
ECHO BSA: 1.8 M2
ERYTHROCYTE [DISTWIDTH] IN BLOOD BY AUTOMATED COUNT: 13.8 % (ref 11.5–15)
GFR, ESTIMATED: 39 ML/MIN/1.73M2
GLUCOSE SERPL-MCNC: 92 MG/DL (ref 74–99)
HCT VFR BLD AUTO: 36.7 % (ref 37–54)
HGB BLD-MCNC: 11.8 G/DL (ref 12.5–16.5)
MAGNESIUM SERPL-MCNC: 2.3 MG/DL (ref 1.6–2.6)
MCH RBC QN AUTO: 31 PG (ref 26–35)
MCHC RBC AUTO-ENTMCNC: 32.2 G/DL (ref 32–34.5)
MCV RBC AUTO: 96.3 FL (ref 80–99.9)
PLATELET # BLD AUTO: 209 K/UL (ref 130–450)
PMV BLD AUTO: 10.8 FL (ref 7–12)
POTASSIUM SERPL-SCNC: 4.6 MMOL/L (ref 3.5–5)
RBC # BLD AUTO: 3.81 M/UL (ref 3.8–5.8)
SODIUM SERPL-SCNC: 142 MMOL/L (ref 132–146)
TSH SERPL DL<=0.05 MIU/L-ACNC: 1.97 UIU/ML (ref 0.27–4.2)
WBC OTHER # BLD: 7.2 K/UL (ref 4.5–11.5)

## 2025-01-15 PROCEDURE — C1900 LEAD, CORONARY VENOUS: HCPCS | Performed by: INTERNAL MEDICINE

## 2025-01-15 PROCEDURE — 7100000011 HC PHASE II RECOVERY - ADDTL 15 MIN: Performed by: INTERNAL MEDICINE

## 2025-01-15 PROCEDURE — 6360000002 HC RX W HCPCS: Performed by: NURSE ANESTHETIST, CERTIFIED REGISTERED

## 2025-01-15 PROCEDURE — 6360000004 HC RX CONTRAST MEDICATION: Performed by: INTERNAL MEDICINE

## 2025-01-15 PROCEDURE — 6360000002 HC RX W HCPCS: Performed by: INTERNAL MEDICINE

## 2025-01-15 PROCEDURE — 3700000001 HC ADD 15 MINUTES (ANESTHESIA): Performed by: INTERNAL MEDICINE

## 2025-01-15 PROCEDURE — G0378 HOSPITAL OBSERVATION PER HR: HCPCS

## 2025-01-15 PROCEDURE — 2580000003 HC RX 258: Performed by: NURSE ANESTHETIST, CERTIFIED REGISTERED

## 2025-01-15 PROCEDURE — 80048 BASIC METABOLIC PNL TOTAL CA: CPT

## 2025-01-15 PROCEDURE — 85027 COMPLETE CBC AUTOMATED: CPT

## 2025-01-15 PROCEDURE — 71045 X-RAY EXAM CHEST 1 VIEW: CPT

## 2025-01-15 PROCEDURE — 2500000003 HC RX 250 WO HCPCS: Performed by: INTERNAL MEDICINE

## 2025-01-15 PROCEDURE — C1882 AICD, OTHER THAN SING/DUAL: HCPCS | Performed by: INTERNAL MEDICINE

## 2025-01-15 PROCEDURE — 7100000010 HC PHASE II RECOVERY - FIRST 15 MIN: Performed by: INTERNAL MEDICINE

## 2025-01-15 PROCEDURE — 84443 ASSAY THYROID STIM HORMONE: CPT

## 2025-01-15 PROCEDURE — 93005 ELECTROCARDIOGRAM TRACING: CPT | Performed by: INTERNAL MEDICINE

## 2025-01-15 PROCEDURE — C1730 CATH, EP, 19 OR FEW ELECT: HCPCS | Performed by: INTERNAL MEDICINE

## 2025-01-15 PROCEDURE — C1769 GUIDE WIRE: HCPCS | Performed by: INTERNAL MEDICINE

## 2025-01-15 PROCEDURE — 3700000000 HC ANESTHESIA ATTENDED CARE: Performed by: INTERNAL MEDICINE

## 2025-01-15 PROCEDURE — 2709999900 HC NON-CHARGEABLE SUPPLY: Performed by: INTERNAL MEDICINE

## 2025-01-15 PROCEDURE — 83735 ASSAY OF MAGNESIUM: CPT

## 2025-01-15 PROCEDURE — C1889 IMPLANT/INSERT DEVICE, NOC: HCPCS | Performed by: INTERNAL MEDICINE

## 2025-01-15 PROCEDURE — 6370000000 HC RX 637 (ALT 250 FOR IP): Performed by: INTERNAL MEDICINE

## 2025-01-15 PROCEDURE — 33225 L VENTRIC PACING LEAD ADD-ON: CPT | Performed by: INTERNAL MEDICINE

## 2025-01-15 PROCEDURE — C1894 INTRO/SHEATH, NON-LASER: HCPCS | Performed by: INTERNAL MEDICINE

## 2025-01-15 PROCEDURE — 2720000010 HC SURG SUPPLY STERILE: Performed by: INTERNAL MEDICINE

## 2025-01-15 PROCEDURE — 2500000003 HC RX 250 WO HCPCS: Performed by: NURSE ANESTHETIST, CERTIFIED REGISTERED

## 2025-01-15 PROCEDURE — 33264 RMVL & RPLCMT DFB GEN MLT LD: CPT | Performed by: INTERNAL MEDICINE

## 2025-01-15 DEVICE — CRTD DTPA2QQ COBALT XT HF QUAD MRI DF4
Type: IMPLANTABLE DEVICE | Site: CHEST  WALL | Status: FUNCTIONAL
Brand: COBALT™ XT HF QUAD CRT-D MRI SURESCAN™

## 2025-01-15 DEVICE — ENVELOPE CMRM6133 ABSORB LRG MR
Type: IMPLANTABLE DEVICE | Site: CHEST  WALL | Status: FUNCTIONAL
Brand: TYRX™

## 2025-01-15 DEVICE — LEAD 429888 MRI CANT US
Type: IMPLANTABLE DEVICE | Site: HEART | Status: FUNCTIONAL
Brand: ATTAIN PERFORMA™ MRI SURESCAN™

## 2025-01-15 RX ORDER — SODIUM CHLORIDE 0.9 % (FLUSH) 0.9 %
5-40 SYRINGE (ML) INJECTION EVERY 12 HOURS SCHEDULED
Status: DISCONTINUED | OUTPATIENT
Start: 2025-01-15 | End: 2025-01-15 | Stop reason: HOSPADM

## 2025-01-15 RX ORDER — SODIUM CHLORIDE 0.9 % (FLUSH) 0.9 %
5-40 SYRINGE (ML) INJECTION PRN
Status: DISCONTINUED | OUTPATIENT
Start: 2025-01-15 | End: 2025-01-15 | Stop reason: HOSPADM

## 2025-01-15 RX ORDER — METOPROLOL SUCCINATE 25 MG/1
50 TABLET, EXTENDED RELEASE ORAL DAILY
Status: DISCONTINUED | OUTPATIENT
Start: 2025-01-16 | End: 2025-01-16 | Stop reason: HOSPADM

## 2025-01-15 RX ORDER — SODIUM CHLORIDE 9 MG/ML
INJECTION, SOLUTION INTRAVENOUS
Status: DISCONTINUED | OUTPATIENT
Start: 2025-01-15 | End: 2025-01-15 | Stop reason: SDUPTHER

## 2025-01-15 RX ORDER — PHENYLEPHRINE HCL IN 0.9% NACL 1 MG/10 ML
SYRINGE (ML) INTRAVENOUS
Status: DISCONTINUED | OUTPATIENT
Start: 2025-01-15 | End: 2025-01-15 | Stop reason: SDUPTHER

## 2025-01-15 RX ORDER — SODIUM CHLORIDE 0.9 % (FLUSH) 0.9 %
5-40 SYRINGE (ML) INJECTION PRN
Status: DISCONTINUED | OUTPATIENT
Start: 2025-01-15 | End: 2025-01-16 | Stop reason: HOSPADM

## 2025-01-15 RX ORDER — DOXYCYCLINE HYCLATE 100 MG
100 TABLET ORAL 2 TIMES DAILY
Qty: 14 TABLET | Refills: 0 | Status: SHIPPED | OUTPATIENT
Start: 2025-01-15 | End: 2025-01-22

## 2025-01-15 RX ORDER — IOPAMIDOL 755 MG/ML
INJECTION, SOLUTION INTRAVASCULAR PRN
Status: DISCONTINUED | OUTPATIENT
Start: 2025-01-15 | End: 2025-01-15 | Stop reason: HOSPADM

## 2025-01-15 RX ORDER — CEFAZOLIN SODIUM 1 G/3ML
INJECTION, POWDER, FOR SOLUTION INTRAMUSCULAR; INTRAVENOUS
Status: DISCONTINUED | OUTPATIENT
Start: 2025-01-15 | End: 2025-01-15 | Stop reason: SDUPTHER

## 2025-01-15 RX ORDER — ATORVASTATIN CALCIUM 20 MG/1
20 TABLET, FILM COATED ORAL NIGHTLY
Status: DISCONTINUED | OUTPATIENT
Start: 2025-01-15 | End: 2025-01-16 | Stop reason: HOSPADM

## 2025-01-15 RX ORDER — LISINOPRIL 5 MG/1
5 TABLET ORAL EVERY MORNING
Status: DISCONTINUED | OUTPATIENT
Start: 2025-01-16 | End: 2025-01-16 | Stop reason: HOSPADM

## 2025-01-15 RX ORDER — ACETAMINOPHEN 325 MG/1
650 TABLET ORAL EVERY 4 HOURS PRN
Status: DISCONTINUED | OUTPATIENT
Start: 2025-01-15 | End: 2025-01-16 | Stop reason: HOSPADM

## 2025-01-15 RX ORDER — LIDOCAINE HYDROCHLORIDE 10 MG/ML
INJECTION, SOLUTION INFILTRATION; PERINEURAL PRN
Status: DISCONTINUED | OUTPATIENT
Start: 2025-01-15 | End: 2025-01-15 | Stop reason: HOSPADM

## 2025-01-15 RX ORDER — SODIUM CHLORIDE 0.9 % (FLUSH) 0.9 %
5-40 SYRINGE (ML) INJECTION EVERY 12 HOURS SCHEDULED
Status: DISCONTINUED | OUTPATIENT
Start: 2025-01-15 | End: 2025-01-16 | Stop reason: HOSPADM

## 2025-01-15 RX ORDER — FAMOTIDINE 10 MG/ML
INJECTION, SOLUTION INTRAVENOUS
Status: DISCONTINUED | OUTPATIENT
Start: 2025-01-15 | End: 2025-01-15 | Stop reason: SDUPTHER

## 2025-01-15 RX ORDER — VANCOMYCIN HYDROCHLORIDE 1 G/20ML
INJECTION, POWDER, LYOPHILIZED, FOR SOLUTION INTRAVENOUS
Status: DISCONTINUED | OUTPATIENT
Start: 2025-01-15 | End: 2025-01-15 | Stop reason: SDUPTHER

## 2025-01-15 RX ORDER — FENTANYL CITRATE 50 UG/ML
INJECTION, SOLUTION INTRAMUSCULAR; INTRAVENOUS
Status: DISCONTINUED | OUTPATIENT
Start: 2025-01-15 | End: 2025-01-15 | Stop reason: SDUPTHER

## 2025-01-15 RX ORDER — SODIUM CHLORIDE 9 MG/ML
INJECTION, SOLUTION INTRAVENOUS PRN
Status: DISCONTINUED | OUTPATIENT
Start: 2025-01-15 | End: 2025-01-15 | Stop reason: HOSPADM

## 2025-01-15 RX ORDER — MIDAZOLAM HYDROCHLORIDE 1 MG/ML
INJECTION, SOLUTION INTRAMUSCULAR; INTRAVENOUS
Status: DISCONTINUED | OUTPATIENT
Start: 2025-01-15 | End: 2025-01-15 | Stop reason: SDUPTHER

## 2025-01-15 RX ORDER — PROPOFOL 10 MG/ML
INJECTION, EMULSION INTRAVENOUS
Status: DISCONTINUED | OUTPATIENT
Start: 2025-01-15 | End: 2025-01-15 | Stop reason: SDUPTHER

## 2025-01-15 RX ORDER — DIPHENHYDRAMINE HYDROCHLORIDE 50 MG/ML
INJECTION INTRAMUSCULAR; INTRAVENOUS
Status: DISCONTINUED | OUTPATIENT
Start: 2025-01-15 | End: 2025-01-15 | Stop reason: SDUPTHER

## 2025-01-15 RX ADMIN — Medication 50 MCG: at 10:45

## 2025-01-15 RX ADMIN — SODIUM CHLORIDE: 9 INJECTION, SOLUTION INTRAVENOUS at 08:58

## 2025-01-15 RX ADMIN — WATER 1000 MG: 1 INJECTION INTRAMUSCULAR; INTRAVENOUS; SUBCUTANEOUS at 17:41

## 2025-01-15 RX ADMIN — MIDAZOLAM 1 MG: 1 INJECTION INTRAMUSCULAR; INTRAVENOUS at 10:18

## 2025-01-15 RX ADMIN — VANCOMYCIN HYDROCHLORIDE 1000 MG: 1 INJECTION, POWDER, LYOPHILIZED, FOR SOLUTION INTRAVENOUS at 09:18

## 2025-01-15 RX ADMIN — PROPOFOL 20 MCG/KG/MIN: 10 INJECTION, EMULSION INTRAVENOUS at 09:15

## 2025-01-15 RX ADMIN — PROPOFOL 50 MG: 10 INJECTION, EMULSION INTRAVENOUS at 10:30

## 2025-01-15 RX ADMIN — PROPOFOL 30 MG: 10 INJECTION, EMULSION INTRAVENOUS at 09:32

## 2025-01-15 RX ADMIN — ATORVASTATIN CALCIUM 20 MG: 20 TABLET, FILM COATED ORAL at 21:43

## 2025-01-15 RX ADMIN — SODIUM CHLORIDE, PRESERVATIVE FREE 10 ML: 5 INJECTION INTRAVENOUS at 21:44

## 2025-01-15 RX ADMIN — METHYLPREDNISOLONE SODIUM SUCCINATE 125 MG: 125 INJECTION, POWDER, FOR SOLUTION INTRAMUSCULAR; INTRAVENOUS at 09:15

## 2025-01-15 RX ADMIN — Medication 50 MCG: at 10:43

## 2025-01-15 RX ADMIN — DIPHENHYDRAMINE HYDROCHLORIDE 50 MG: 50 INJECTION, SOLUTION INTRAMUSCULAR; INTRAVENOUS at 09:15

## 2025-01-15 RX ADMIN — CEFAZOLIN 2 G: 1 INJECTION, POWDER, FOR SOLUTION INTRAMUSCULAR; INTRAVENOUS at 09:21

## 2025-01-15 RX ADMIN — FENTANYL CITRATE 25 MCG: 50 INJECTION, SOLUTION INTRAMUSCULAR; INTRAVENOUS at 09:25

## 2025-01-15 RX ADMIN — FAMOTIDINE 20 MG: 10 INJECTION, SOLUTION INTRAVENOUS at 09:22

## 2025-01-15 RX ADMIN — FENTANYL CITRATE 25 MCG: 50 INJECTION, SOLUTION INTRAMUSCULAR; INTRAVENOUS at 10:05

## 2025-01-15 RX ADMIN — FENTANYL CITRATE 25 MCG: 50 INJECTION, SOLUTION INTRAMUSCULAR; INTRAVENOUS at 09:32

## 2025-01-15 RX ADMIN — FENTANYL CITRATE 25 MCG: 50 INJECTION, SOLUTION INTRAMUSCULAR; INTRAVENOUS at 09:19

## 2025-01-15 RX ADMIN — SODIUM CHLORIDE: 9 INJECTION, SOLUTION INTRAVENOUS at 09:18

## 2025-01-15 RX ADMIN — MIDAZOLAM 1 MG: 1 INJECTION INTRAMUSCULAR; INTRAVENOUS at 09:10

## 2025-01-15 ASSESSMENT — LIFESTYLE VARIABLES: HOW OFTEN DO YOU HAVE A DRINK CONTAINING ALCOHOL: NEVER

## 2025-01-15 NOTE — DISCHARGE INSTRUCTIONS
[facility] Electrophysiology ICD Discharge Instructions      Medications:  Resume all home meds except Eliquis. Restart Eliquis on 1/18/25    Incision Care:  Brown (Aquacel) bandage: Leave this dressing on for 7 days. Remove this dressing on Wednesday 1/22/25.  Steri Strips: Located underneath the brown bandage. Do NOT remove the steri strips, they will either fall off on their own, or be removed at your follow up appointment.   Bathing: Keep the incision dry. You may shower tomorrow evening, but do NOT spray the water directly at the site or scrub at the site. Pat dry only. Do NOT submerge incision site for at least 2 weeks and until cleared by [facility].  Daily monitoring: Check the area daily. Notify the office at [phone] or [phone] if you develop any redness, swelling, drainage, warmth, or fever greater than 100 degrees.  No lotions, powders, or creams to site.       Activity:  You may continue regular activity; but limit strenuous movements or stretching of the arm closest to your ICD.    Wear the arm immobilizer at all times for 48 hours and then at night for 4 weeks if you feel you may not be able to follow arm restrictions at night.    Avoid pulling yourself up with that arm.    Do not raise elbow higher than shoulder height, do not lift anything weighing more than 3 pounds with your arm, and do not performing any stretching motions with your arm for 6 weeks.    Do not do any activities such as golfing, vacuuming, or mowing the lawn for 6 weeks.    Prevent any hard blows to the ICD.      Driving:  AFTER cleared by [facility] appointment.  Start with local/short trips to familiar places. Avoid highway/ high-speed driving for the first few days after you resume driving.  DO NOT drive until you have stopped taking prescription pain medications.     Possible Defibrillator Interferences:  Avoid high frequency ham radios, arc welders, battery powered tools, and strong electromagnetic fields.

## 2025-01-15 NOTE — ANESTHESIA PRE PROCEDURE
comment: weakness GI/Hepatic/Renal:            ROS comment: Hematuria syndrome.   Endo/Other:              Pt had no PAT visit        ROS comment: Reg ETOH Abdominal:             Vascular:           ROS comment: Subclavian vein stenosis. Other Findings:             Anesthesia Plan      MAC     ASA 4       Induction: intravenous.    MIPS: Prophylactic antiemetics administered.  Anesthetic plan and risks discussed with patient.    Use of blood products discussed with patient whom consented to blood products.    Plan discussed with CRNA.                    Deejay Oliva,    1/15/2025

## 2025-01-15 NOTE — PLAN OF CARE
Problem: Discharge Planning  Goal: Discharge to home or other facility with appropriate resources  Outcome: Progressing  Flowsheets  Taken 1/15/2025 1251  Discharge to home or other facility with appropriate resources: Identify barriers to discharge with patient and caregiver  Taken 1/15/2025 1215  Discharge to home or other facility with appropriate resources: Identify barriers to discharge with patient and caregiver

## 2025-01-15 NOTE — PROGRESS NOTES
Patient is sleepy but, he is easy to arouse and will carry on conversation. Nurse to nurse given to Samanta BARNARD. Radiology called for chest x-ray.

## 2025-01-15 NOTE — PROGRESS NOTES
Patient transferred to CSU. Patient A&O x 3. VSS. Upper left chest site soft, without oozing or hematoma. Dressing clean and dry. Patient sleepy, but easily arouses.

## 2025-01-15 NOTE — H&P
Cardiac Electrophysiology   History and physical  Gene DAVON Bangura  1940  Date of Service: 1/15/2025  Referring Provider/PCP: Fernando Alba,     Patient here for upgrade of his dual ICD to CRT-D        Patient Active Problem List    Diagnosis Date Noted    S/P internal cardiac defibrillator procedure 01/15/2025     Priority: High    Acute kidney injury (HCC)      Priority: Medium    Weakness 06/12/2022     Priority: Medium    Subclavian vein stenosis, left 12/12/2024    Pure hypercholesterolemia 08/18/2021    BPH without obstruction/lower urinary tract symptoms 08/18/2021    Urethral pain 07/25/2019    Gross hematuria 07/25/2019    History of implantable cardioverter-defibrillator (ICD) placement 04/02/2019    S/P ICD (internal cardiac defibrillator) procedure 04/02/2019    Nonsustained ventricular tachycardia (HCC) 12/05/2018    Near syncope 12/05/2018    Mitral regurgitation 12/05/2018    Ventricular tachycardia (paroxysmal) (HCC) 12/05/2018     [facility]    Mitral valve repair for acute mitral regurgitation in 1995.  Surgery was performed at [facility].    2.  Extremely irregular cardiology follow-up thereafter.  3.  Chronic Left bundle-branch block   4.   Transurethral resection procedure performed in July 2018 with recurrent complications related to persistent stricture at the bladder neck and multiple UTIs.    5.   Most recent urological procedure was performed in October 2018 at [facility].  6.   Multiple episodes of ventricular ectopy were noted during that procedure.  7.   The patient was thereafter seen by his primary care physician who performed a Holter monitor, which revealed frequent premature ventricular contractions, but there was also nonsustained ventricular tachycardia with one episode lasting for 6 beats noted.  The patient was therefore referred to my office urgently for evaluation.  8.   The patient has had episodes of near syncope, several weeks prior to his last

## 2025-01-15 NOTE — PROGRESS NOTES
4 Eyes Skin Assessment     NAME:  Cam Bangura  YOB: 1940  MEDICAL RECORD NUMBER:  [id number]    The patient is being assessed for  Admission    I agree that at least one RN has performed a thorough Head to Toe Skin Assessment on the patient. ALL assessment sites listed below have been assessed.      Areas assessed by both nurses:    Head, Face, Ears, Shoulders, Back, Chest, Arms, Elbows, Hands, Sacrum. Buttock, Coccyx, Ischium, Legs. Feet and Heels, and Under Medical Devices         Does the Patient have a Wound? Left chest incision       Richie Prevention initiated by RN: No  Wound Care Orders initiated by RN: No    Pressure Injury (Stage 3,4, Unstageable, DTI, NWPT, and Complex wounds) if present, place Wound referral order by RN under : No    New Ostomies, if present place, Ostomy referral order under : No     Nurse 1 eSignature: Electronically signed by JONI SOLER RN on 1/15/25 at 12:37 PM EST    **SHARE this note so that the co-signing nurse can place an eSignature**    Nurse 2 eSignature: Electronically signed by Halle Soares RN on 1/15/25 at 1:25 PM EST

## 2025-01-16 ENCOUNTER — APPOINTMENT (OUTPATIENT)
Dept: GENERAL RADIOLOGY | Age: 85
End: 2025-01-16
Attending: INTERNAL MEDICINE
Payer: MEDICARE

## 2025-01-16 VITALS
OXYGEN SATURATION: 99 % | SYSTOLIC BLOOD PRESSURE: 135 MMHG | RESPIRATION RATE: 18 BRPM | HEART RATE: 61 BPM | HEIGHT: 71 IN | DIASTOLIC BLOOD PRESSURE: 76 MMHG | BODY MASS INDEX: 20.02 KG/M2 | TEMPERATURE: 97.4 F | WEIGHT: 143 LBS

## 2025-01-16 LAB
ANION GAP SERPL CALCULATED.3IONS-SCNC: 13 MMOL/L (ref 7–16)
BUN SERPL-MCNC: 37 MG/DL (ref 6–23)
CALCIUM SERPL-MCNC: 8.5 MG/DL (ref 8.6–10.2)
CHLORIDE SERPL-SCNC: 111 MMOL/L (ref 98–107)
CO2 SERPL-SCNC: 19 MMOL/L (ref 22–29)
CREAT SERPL-MCNC: 1.6 MG/DL (ref 0.7–1.2)
ERYTHROCYTE [DISTWIDTH] IN BLOOD BY AUTOMATED COUNT: 13.8 % (ref 11.5–15)
GFR, ESTIMATED: 42 ML/MIN/1.73M2
GLUCOSE SERPL-MCNC: 138 MG/DL (ref 74–99)
HCT VFR BLD AUTO: 33.7 % (ref 37–54)
HGB BLD-MCNC: 10.9 G/DL (ref 12.5–16.5)
MCH RBC QN AUTO: 31 PG (ref 26–35)
MCHC RBC AUTO-ENTMCNC: 32.3 G/DL (ref 32–34.5)
MCV RBC AUTO: 95.7 FL (ref 80–99.9)
PLATELET # BLD AUTO: 198 K/UL (ref 130–450)
PMV BLD AUTO: 11.4 FL (ref 7–12)
POTASSIUM SERPL-SCNC: 4.7 MMOL/L (ref 3.5–5)
RBC # BLD AUTO: 3.52 M/UL (ref 3.8–5.8)
SODIUM SERPL-SCNC: 143 MMOL/L (ref 132–146)
WBC OTHER # BLD: 11.5 K/UL (ref 4.5–11.5)

## 2025-01-16 PROCEDURE — G0378 HOSPITAL OBSERVATION PER HR: HCPCS

## 2025-01-16 PROCEDURE — 6370000000 HC RX 637 (ALT 250 FOR IP): Performed by: INTERNAL MEDICINE

## 2025-01-16 PROCEDURE — 85027 COMPLETE CBC AUTOMATED: CPT

## 2025-01-16 PROCEDURE — 2500000003 HC RX 250 WO HCPCS: Performed by: INTERNAL MEDICINE

## 2025-01-16 PROCEDURE — 36415 COLL VENOUS BLD VENIPUNCTURE: CPT

## 2025-01-16 PROCEDURE — 71046 X-RAY EXAM CHEST 2 VIEWS: CPT

## 2025-01-16 PROCEDURE — 80048 BASIC METABOLIC PNL TOTAL CA: CPT

## 2025-01-16 PROCEDURE — 6360000002 HC RX W HCPCS: Performed by: INTERNAL MEDICINE

## 2025-01-16 RX ADMIN — LISINOPRIL 5 MG: 5 TABLET ORAL at 10:05

## 2025-01-16 RX ADMIN — METOPROLOL SUCCINATE 50 MG: 25 TABLET, EXTENDED RELEASE ORAL at 10:05

## 2025-01-16 RX ADMIN — WATER 1000 MG: 1 INJECTION INTRAMUSCULAR; INTRAVENOUS; SUBCUTANEOUS at 04:46

## 2025-01-16 RX ADMIN — SODIUM CHLORIDE, PRESERVATIVE FREE 10 ML: 5 INJECTION INTRAVENOUS at 04:46

## 2025-01-16 NOTE — DISCHARGE SUMMARY
[facility]  Discharge Summary  Patient: Cam Bangura  Medical Record Number: [id number]  Date of Procedure:  1/16/2025  Operating Electrophysiologist: Carolina Mcgee MD  Primary Electrophysiologist: Alexandra Roth MD  Referring Physician: Fernando Alba DO and     Admission Date:1/15/2025      Discharge Date:  1/16/25    Patient Active Problem List   Diagnosis    Ventricular tachycardia (HCC)    Near syncope    Mitral regurgitation    Ventricular tachycardia (paroxysmal) ([facility])    History of implantable cardioverter-defibrillator (ICD) placement    S/P ICD (internal cardiac defibrillator) procedure    Urethral pain    Pure hypercholesterolemia    Gross hematuria    BPH without obstruction/lower urinary tract symptoms    Weakness    Acute kidney injury ([facility])    Subclavian vein stenosis, left    S/P internal cardiac defibrillator procedure    Complete left bundle branch block    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, 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%) ([facility])          Medication List        START taking these medications      doxycycline hyclate 100 MG tablet  Commonly known as: VIBRA-TABS  Take 1 tablet by mouth 2 times daily for 7 days            CONTINUE taking these medications      apixaban 2.5 MG Tabs tablet  Commonly known as: ELIQUIS  Take 1 tablet by mouth 2 times daily     atorvastatin 20 MG tablet  Commonly known as: LIPITOR     Blood Pressure Kit  1 kit by Does not apply route daily     lisinopril 5 MG tablet  Commonly known as: PRINIVIL;ZESTRIL  Take 1 tablet by mouth every morning     metoprolol succinate 50 MG extended release tablet  Commonly known as: TOPROL XL  Take 1 tablet by mouth daily               Where to Get Your Medications        These medications were sent to [facility] [address] - P [phone] - F [phone]  [address], [facility] [address]      Phone:

## 2025-01-16 NOTE — ANESTHESIA POSTPROCEDURE EVALUATION
Department of Anesthesiology  Postprocedure Note    Patient: Cam Bangura  MRN: [id number]  YOB: 1940  Date of evaluation: 1/16/2025    Procedure Summary       Date: 01/15/25 Room / Location: [facility] EP LAB 1 / [facility]    Anesthesia Start: 0857 Anesthesia Stop: 1118    Procedure: Biventricular ICD upgrade Diagnosis:       Complete left bundle branch block      (LBBB  HFmrEF  Ventricular tachycardia  Dual ICD in situ)    Providers: Alexandra Roth MD Responsible Provider: Deejay Oliva DO    Anesthesia Type: MAC ASA Status: 4            Anesthesia Type: No value filed.    Ramirez Phase I:      Ramirez Phase II:      Anesthesia Post Evaluation    Patient location during evaluation: bedside  Patient participation: complete - patient cannot participate  Level of consciousness: awake and alert  Airway patency: patent  Nausea & Vomiting: no nausea and no vomiting  Cardiovascular status: blood pressure returned to baseline  Respiratory status: acceptable  Hydration status: euvolemic  Multimodal analgesia pain management approach    There were no known notable events for this encounter.

## 2025-01-16 NOTE — PLAN OF CARE
Problem: Discharge Planning  Goal: Discharge to home or other facility with appropriate resources  1/16/2025 0005 by Shaista Bowen RN  Outcome: Progressing

## 2025-01-16 NOTE — NURSE NAVIGATOR
ARRHYTHMIA EDUCATION     Met with patient to review information relating to Ischemic Cardiomyopathy, LBBB, Sudden Cardiac Death, & CRT-D Insertion, & CHF    Discussed the following topics: The Heart's Electrical System, Ischemic Cardiomyopathy, LBBB, Sudden Cardiac Death, CRT-D, Post Operative Care of CRT-D, CRT-D DC Instructions.    Handouts given on above topics given & questions answered. Patient verbalized understanding as evidenced by \"teach back\"     Time spent with patient: 30 minutes

## 2025-01-16 NOTE — CARE COORDINATION
1/16/2025social work transition of care planning  Pt is from home with spouse and had been independent.. Pt pcp is [facility] and pharmacy is [facility] in [place]. Explained sw role in transition of care planning. Pt plan is home spouse to transport.  Electronically signed by JENNIFER Burrows on 1/16/2025 at 10:41 AM

## 2025-01-16 NOTE — PROGRESS NOTES
Discharged to home. Goals met. Monitor removed and placed in nurses station. Reviewed discharge instructions verbalized an understanding.

## 2025-01-17 LAB
EKG ATRIAL RATE: 60 BPM
EKG P-R INTERVAL: 168 MS
EKG Q-T INTERVAL: 546 MS
EKG QRS DURATION: 174 MS
EKG QTC CALCULATION (BAZETT): 546 MS
EKG R AXIS: 107 DEGREES
EKG T AXIS: -62 DEGREES
EKG VENTRICULAR RATE: 60 BPM

## 2025-01-23 ENCOUNTER — TELEPHONE (OUTPATIENT)
Age: 85
End: 2025-01-23

## 2025-01-23 NOTE — TELEPHONE ENCOUNTER
I called Cam  to see how he's doing since his ICD insertion on 1/15/25.  He states he's doing fine & that the aquacel dressing remains intact. He denies any fevers, redness, bleeding, drainage, or swelling at ICD incision site. I reminded him to remove the aquacel dsg today, not to touch the steri strips,  to continue to wear his sling at night for 4 weeks; along with not abducting the L arm above the shoulder. He's also aware of his follow up appt at the [facility] on Wednesday 1/29/25 at 1:00 pm in [place].  He offers no complaints & is thankful for the phone call.

## 2025-01-29 ENCOUNTER — NURSE ONLY (OUTPATIENT)
Dept: NON INVASIVE DIAGNOSTICS | Age: 85
End: 2025-01-29

## 2025-01-29 DIAGNOSIS — Z95.810 PRESENCE OF AUTOMATIC CARDIOVERTER/DEFIBRILLATOR (AICD): ICD-10-CM

## 2025-01-29 DIAGNOSIS — I42.8 NICM (NONISCHEMIC CARDIOMYOPATHY) (HCC): Primary | ICD-10-CM

## 2025-01-29 DIAGNOSIS — I47.29 NSVT (NONSUSTAINED VENTRICULAR TACHYCARDIA) (HCC): ICD-10-CM

## 2025-01-29 DIAGNOSIS — I44.7 COMPLETE LEFT BUNDLE BRANCH BLOCK: ICD-10-CM

## 2025-02-24 ENCOUNTER — NURSE ONLY (OUTPATIENT)
Dept: NON INVASIVE DIAGNOSTICS | Age: 85
End: 2025-02-24

## 2025-02-24 DIAGNOSIS — Z95.810 PRESENCE OF AUTOMATIC CARDIOVERTER/DEFIBRILLATOR (AICD): ICD-10-CM

## 2025-02-24 DIAGNOSIS — I42.8 NICM (NONISCHEMIC CARDIOMYOPATHY) (HCC): Primary | ICD-10-CM

## 2025-02-24 DIAGNOSIS — I44.7 COMPLETE LEFT BUNDLE BRANCH BLOCK: ICD-10-CM

## 2025-02-24 DIAGNOSIS — I47.29 NSVT (NONSUSTAINED VENTRICULAR TACHYCARDIA) (HCC): ICD-10-CM

## 2025-03-10 RX ORDER — LISINOPRIL 5 MG/1
5 TABLET ORAL EVERY MORNING
Qty: 90 TABLET | Refills: 0 | Status: SHIPPED | OUTPATIENT
Start: 2025-03-10

## 2025-03-18 RX ORDER — LISINOPRIL 5 MG/1
5 TABLET ORAL EVERY MORNING
Qty: 90 TABLET | Refills: 3 | Status: SHIPPED | OUTPATIENT
Start: 2025-03-18

## 2025-05-14 ENCOUNTER — OFFICE VISIT (OUTPATIENT)
Dept: CARDIOLOGY CLINIC | Age: 85
End: 2025-05-14
Payer: MEDICARE

## 2025-05-14 VITALS
HEART RATE: 56 BPM | WEIGHT: 144 LBS | DIASTOLIC BLOOD PRESSURE: 70 MMHG | HEIGHT: 71 IN | SYSTOLIC BLOOD PRESSURE: 118 MMHG | RESPIRATION RATE: 20 BRPM | BODY MASS INDEX: 20.16 KG/M2

## 2025-05-14 DIAGNOSIS — I47.29 NONSUSTAINED VENTRICULAR TACHYCARDIA (HCC): ICD-10-CM

## 2025-05-14 DIAGNOSIS — M54.41 CHRONIC RIGHT-SIDED LOW BACK PAIN WITH RIGHT-SIDED SCIATICA: ICD-10-CM

## 2025-05-14 DIAGNOSIS — Z98.890 HISTORY OF MITRAL VALVE REPAIR: ICD-10-CM

## 2025-05-14 DIAGNOSIS — D64.9 MILD ANEMIA: ICD-10-CM

## 2025-05-14 DIAGNOSIS — Z79.01 ANTICOAGULATED BY ANTICOAGULATION TREATMENT: ICD-10-CM

## 2025-05-14 DIAGNOSIS — Z95.810 CARDIAC RESYNCHRONIZATION THERAPY DEFIBRILLATOR (CRT-D) IN PLACE: ICD-10-CM

## 2025-05-14 DIAGNOSIS — Z86.79 HISTORY OF LEFT BUNDLE BRANCH BLOCK: ICD-10-CM

## 2025-05-14 DIAGNOSIS — E78.00 HYPERCHOLESTEREMIA: ICD-10-CM

## 2025-05-14 DIAGNOSIS — N18.31 STAGE 3A CHRONIC KIDNEY DISEASE (HCC): ICD-10-CM

## 2025-05-14 DIAGNOSIS — Z90.79 H/O TRANSURETHRAL RESECTION OF PROSTATE: ICD-10-CM

## 2025-05-14 DIAGNOSIS — I47.20 PAROXYSMAL VENTRICULAR TACHYCARDIA (HCC): ICD-10-CM

## 2025-05-14 DIAGNOSIS — Z86.19 HISTORY OF SHINGLES: ICD-10-CM

## 2025-05-14 DIAGNOSIS — G89.29 CHRONIC RIGHT-SIDED LOW BACK PAIN WITH RIGHT-SIDED SCIATICA: ICD-10-CM

## 2025-05-14 DIAGNOSIS — Z86.79 HISTORY OF FIRST DEGREE AV BLOCK: ICD-10-CM

## 2025-05-14 DIAGNOSIS — I83.93 ASYMPTOMATIC VARICOSE VEINS OF BOTH LOWER EXTREMITIES: ICD-10-CM

## 2025-05-14 DIAGNOSIS — Z98.890 H/O TRANSURETHRAL RESECTION OF PROSTATE: ICD-10-CM

## 2025-05-14 DIAGNOSIS — I42.8 NONISCHEMIC CARDIOMYOPATHY (HCC): ICD-10-CM

## 2025-05-14 DIAGNOSIS — N32.3 BLADDER DIVERTICULUM: ICD-10-CM

## 2025-05-14 DIAGNOSIS — I48.0 PAF (PAROXYSMAL ATRIAL FIBRILLATION) (HCC): ICD-10-CM

## 2025-05-14 DIAGNOSIS — I38 VHD (VALVULAR HEART DISEASE): Primary | ICD-10-CM

## 2025-05-14 PROCEDURE — 1160F RVW MEDS BY RX/DR IN RCRD: CPT | Performed by: INTERNAL MEDICINE

## 2025-05-14 PROCEDURE — 1159F MED LIST DOCD IN RCRD: CPT | Performed by: INTERNAL MEDICINE

## 2025-05-14 PROCEDURE — 93000 ELECTROCARDIOGRAM COMPLETE: CPT | Performed by: INTERNAL MEDICINE

## 2025-05-14 PROCEDURE — 1123F ACP DISCUSS/DSCN MKR DOCD: CPT | Performed by: INTERNAL MEDICINE

## 2025-05-14 PROCEDURE — 99214 OFFICE O/P EST MOD 30 MIN: CPT | Performed by: INTERNAL MEDICINE

## 2025-05-14 NOTE — PROGRESS NOTES
OFFICE VISIT        PRIMARY CARE PHYSICIAN:    Fernando Alba DO         ALLERGIES / SENSITIVITIES:    Allergies   Allergen Reactions    Iodine Swelling     Facial Swelling and Throat Closes Up    Shrimp (Diagnostic)     Shrimp Extract Swelling          REVIEWED MEDICATIONS:      Current Outpatient Medications:     lisinopril (PRINIVIL;ZESTRIL) 5 MG tablet, Take 1 tablet by mouth every morning, Disp: 90 tablet, Rfl: 3    apixaban (ELIQUIS) 2.5 MG TABS tablet, Take 1 tablet by mouth 2 times daily, Disp: 60 tablet, Rfl: 5    metoprolol succinate (TOPROL XL) 50 MG extended release tablet, Take 1 tablet by mouth daily, Disp: 90 tablet, Rfl: 3    atorvastatin (LIPITOR) 20 MG tablet, Take 1 tablet by mouth daily, Disp: , Rfl:     Blood Pressure KIT, 1 kit by Does not apply route daily, Disp: 1 kit, Rfl: 0        S: REASON FOR VISIT:    Valvular heart disease status post mitral valve repair in 1995.  Cardiomyopathy.  History of nonsustained ventricular tachycardia and inducible sustained polymorphic ventricular tachycardia degenerating into ventricular fibrillation on electrophysiologic testing status post dual-chamber ICD implantation 4/2019 with upgrade to CRT-D 1/15/2025.  Paroxysmal atrial fibrillation on chronic anticoagulation with EliquisRajat Seymour is a pleasant 84-year-old male with cardiovascular history as below.  He was last seen by me in the office on 11/6/2024.  He returns today for routine follow-up.  He denies chest pain.  He denies significant dyspnea with his daily activities.  He denies orthopnea, PND's or significant lower extremity swelling.  He denies palpitations, dizziness, presyncope or syncope or discharges from his ICD.     REVIEW OF SYSTEMS:    CONSTITUTIONAL:  Denies fevers, chills, night sweats or fatigue.  HEENT:  Denies any unusual headaches.  Denies recent changes in hearing or vision.  Denies dysphagia, hoarseness, hemoptysis, hematemesis or epistaxis.   ENDOCRINE:  Denies polyphagia,

## 2025-05-15 ENCOUNTER — TELEPHONE (OUTPATIENT)
Dept: NON INVASIVE DIAGNOSTICS | Age: 85
End: 2025-05-15

## 2025-06-16 RX ORDER — LISINOPRIL 5 MG/1
5 TABLET ORAL EVERY MORNING
Qty: 90 TABLET | Refills: 3 | Status: SHIPPED | OUTPATIENT
Start: 2025-06-16

## 2025-08-20 ENCOUNTER — CLINICAL SUPPORT (OUTPATIENT)
Dept: NON INVASIVE DIAGNOSTICS | Age: 85
End: 2025-08-20

## 2025-08-20 DIAGNOSIS — Z95.810 PRESENCE OF AUTOMATIC CARDIOVERTER/DEFIBRILLATOR (AICD): Primary | ICD-10-CM

## 2025-08-20 DIAGNOSIS — I47.29 NSVT (NONSUSTAINED VENTRICULAR TACHYCARDIA) (HCC): ICD-10-CM

## 2025-08-20 DIAGNOSIS — I42.8 NICM (NONISCHEMIC CARDIOMYOPATHY) (HCC): ICD-10-CM

## 2025-08-20 DIAGNOSIS — I44.7 COMPLETE LEFT BUNDLE BRANCH BLOCK: ICD-10-CM

## (undated) DEVICE — GLOVE SURG SZ 75 STD WHT LTX SYN POLYMER BEAD REINF ANTI RL

## (undated) DEVICE — RADIFOCUS GLIDEWIRE: Brand: GLIDEWIRE

## (undated) DEVICE — DRAINBAG,ANTI-REFLUX TOWER,L/F,2000ML,LL: Brand: MEDLINE

## (undated) DEVICE — RUNTHROUGH NS EXTRA FLOPPY PTCA GUIDEWIRE: Brand: RUNTHROUGH

## (undated) DEVICE — AMPLATZ TYPE RENAL DILATOR/SHEATH SET

## (undated) DEVICE — READY WET SKIN SCRUB TRAY-LF: Brand: MEDLINE INDUSTRIES, INC.

## (undated) DEVICE — CAMERA STRYKER 1488 HD GEN

## (undated) DEVICE — PAD, DEFIB, ADULT, RADIOTRAN, PHYSIO, LO: Brand: MEDLINE

## (undated) DEVICE — BAG DRAINAGE CONTAINER 15 LT FLUID COLLCTN

## (undated) DEVICE — PLUG,CATHETER,DRAINAGE PROTECTOR,TUBE: Brand: MEDLINE

## (undated) DEVICE — CYSTO PACK: Brand: MEDLINE INDUSTRIES, INC.

## (undated) DEVICE — CATHETER EP CSL 2-8-2 MM 5 FRX120 MM SUPREME

## (undated) DEVICE — BAG DRNGE COMB PK

## (undated) DEVICE — PLASMABLADE PS210-030S 3.0S LOCK: Brand: PLASMABLADE™

## (undated) DEVICE — AGENT HEMOSTATIC SURGIFLOW MATRIX KIT W/THROMBIN

## (undated) DEVICE — GOWN,SIRUS,FABRNF,XL,20/CS: Brand: MEDLINE

## (undated) DEVICE — Z DUP USE 2139333 GUIDEWIRE UROLOGICAL STR STD 0.035 IN X150 CM REG ZIPWIRE LF

## (undated) DEVICE — SYSTEM INTRO 9.5FR L13CM STD WHT CAP HEMSTAT SPLITTABLE

## (undated) DEVICE — SOLUTION IV IRRIG WATER 1000ML POUR BRL 2F7114

## (undated) DEVICE — CATHETER URET 5FR L70CM OPN END SGL LUMN INJ HUB FLEXIMA

## (undated) DEVICE — CATHETER URETH 22FR BLLN 5CC STD LTX 3 W F TWO OPP DRNGE

## (undated) DEVICE — Z INACTIVE USE 2635503 SOLUTION IRRIG 3000ML ST H2O USP UROMATIC PLAS CONT